# Patient Record
Sex: FEMALE | Race: BLACK OR AFRICAN AMERICAN | NOT HISPANIC OR LATINO
[De-identification: names, ages, dates, MRNs, and addresses within clinical notes are randomized per-mention and may not be internally consistent; named-entity substitution may affect disease eponyms.]

---

## 2017-07-27 ENCOUNTER — OTHER (OUTPATIENT)
Age: 32
End: 2017-07-27

## 2017-08-18 ENCOUNTER — APPOINTMENT (OUTPATIENT)
Dept: OBGYN | Facility: CLINIC | Age: 32
End: 2017-08-18

## 2017-09-14 ENCOUNTER — APPOINTMENT (OUTPATIENT)
Dept: OBGYN | Facility: CLINIC | Age: 32
End: 2017-09-14

## 2017-10-23 ENCOUNTER — APPOINTMENT (OUTPATIENT)
Dept: OBGYN | Facility: CLINIC | Age: 32
End: 2017-10-23

## 2018-01-08 ENCOUNTER — LABORATORY RESULT (OUTPATIENT)
Age: 33
End: 2018-01-08

## 2018-01-09 ENCOUNTER — LABORATORY RESULT (OUTPATIENT)
Age: 33
End: 2018-01-09

## 2018-01-09 ENCOUNTER — APPOINTMENT (OUTPATIENT)
Dept: OBGYN | Facility: CLINIC | Age: 33
End: 2018-01-09
Payer: MEDICAID

## 2018-01-09 ENCOUNTER — OUTPATIENT (OUTPATIENT)
Dept: OUTPATIENT SERVICES | Facility: HOSPITAL | Age: 33
LOS: 1 days | End: 2018-01-09
Payer: COMMERCIAL

## 2018-01-09 VITALS
WEIGHT: 144.38 LBS | HEIGHT: 65 IN | SYSTOLIC BLOOD PRESSURE: 100 MMHG | BODY MASS INDEX: 24.06 KG/M2 | DIASTOLIC BLOOD PRESSURE: 62 MMHG

## 2018-01-09 DIAGNOSIS — N76.0 ACUTE VAGINITIS: ICD-10-CM

## 2018-01-09 PROCEDURE — 87389 HIV-1 AG W/HIV-1&-2 AB AG IA: CPT

## 2018-01-09 PROCEDURE — 87624 HPV HI-RISK TYP POOLED RSLT: CPT

## 2018-01-09 PROCEDURE — 87625 HPV TYPES 16 & 18 ONLY: CPT

## 2018-01-09 PROCEDURE — 86780 TREPONEMA PALLIDUM: CPT

## 2018-01-09 PROCEDURE — 88175 CYTOPATH C/V AUTO FLUID REDO: CPT

## 2018-01-09 PROCEDURE — G0463: CPT

## 2018-01-09 PROCEDURE — 88141 CYTOPATH C/V INTERPRET: CPT

## 2018-01-09 PROCEDURE — 99395 PREV VISIT EST AGE 18-39: CPT | Mod: NC

## 2018-01-10 LAB
C TRACH RRNA SPEC QL NAA+PROBE: SIGNIFICANT CHANGE UP
HIV 1+2 AB+HIV1 P24 AG SERPL QL IA: SIGNIFICANT CHANGE UP
HPV HIGH+LOW RISK DNA PNL CVX: DETECTED
N GONORRHOEA RRNA SPEC QL NAA+PROBE: SIGNIFICANT CHANGE UP
SPECIMEN SOURCE: SIGNIFICANT CHANGE UP
T PALLIDUM AB TITR SER: NEGATIVE — SIGNIFICANT CHANGE UP

## 2018-01-11 LAB — CYTOLOGY SPEC DOC CYTO: SIGNIFICANT CHANGE UP

## 2018-01-12 DIAGNOSIS — Z01.419 ENCOUNTER FOR GYNECOLOGICAL EXAMINATION (GENERAL) (ROUTINE) WITHOUT ABNORMAL FINDINGS: ICD-10-CM

## 2018-02-07 ENCOUNTER — APPOINTMENT (OUTPATIENT)
Dept: OBGYN | Facility: CLINIC | Age: 33
End: 2018-02-07
Payer: MEDICAID

## 2018-02-07 ENCOUNTER — OUTPATIENT (OUTPATIENT)
Dept: OUTPATIENT SERVICES | Facility: HOSPITAL | Age: 33
LOS: 1 days | End: 2018-02-07
Payer: MEDICARE

## 2018-02-07 ENCOUNTER — LABORATORY RESULT (OUTPATIENT)
Age: 33
End: 2018-02-07

## 2018-02-07 DIAGNOSIS — R87.619 UNSPECIFIED ABNORMAL CYTOLOGICAL FINDINGS IN SPECIMENS FROM CERVIX UTERI: ICD-10-CM

## 2018-02-07 DIAGNOSIS — N76.0 ACUTE VAGINITIS: ICD-10-CM

## 2018-02-07 PROCEDURE — 57452 EXAM OF CERVIX W/SCOPE: CPT | Mod: GC

## 2018-02-07 PROCEDURE — 57452 EXAM OF CERVIX W/SCOPE: CPT

## 2018-03-26 ENCOUNTER — APPOINTMENT (OUTPATIENT)
Dept: OBGYN | Facility: CLINIC | Age: 33
End: 2018-03-26

## 2018-04-26 ENCOUNTER — APPOINTMENT (OUTPATIENT)
Dept: OBGYN | Facility: CLINIC | Age: 33
End: 2018-04-26
Payer: MEDICAID

## 2018-04-26 ENCOUNTER — LABORATORY RESULT (OUTPATIENT)
Age: 33
End: 2018-04-26

## 2018-04-26 ENCOUNTER — OUTPATIENT (OUTPATIENT)
Dept: OUTPATIENT SERVICES | Facility: HOSPITAL | Age: 33
LOS: 1 days | End: 2018-04-26
Payer: MEDICARE

## 2018-04-26 VITALS — BODY MASS INDEX: 23.8 KG/M2 | WEIGHT: 143 LBS | SYSTOLIC BLOOD PRESSURE: 108 MMHG | DIASTOLIC BLOOD PRESSURE: 70 MMHG

## 2018-04-26 DIAGNOSIS — N76.0 ACUTE VAGINITIS: ICD-10-CM

## 2018-04-26 DIAGNOSIS — Z87.42 PERSONAL HISTORY OF OTHER DISEASES OF THE FEMALE GENITAL TRACT: ICD-10-CM

## 2018-04-26 DIAGNOSIS — N72 INFLAMMATORY DISEASE OF CERVIX UTERI: ICD-10-CM

## 2018-04-26 PROCEDURE — 87800 DETECT AGNT MULT DNA DIREC: CPT

## 2018-04-26 PROCEDURE — G0463: CPT

## 2018-04-26 PROCEDURE — 99213 OFFICE O/P EST LOW 20 MIN: CPT | Mod: NC

## 2018-04-26 PROCEDURE — 87340 HEPATITIS B SURFACE AG IA: CPT

## 2018-04-26 PROCEDURE — 86803 HEPATITIS C AB TEST: CPT

## 2018-04-26 RX ORDER — METRONIDAZOLE 500 MG/1
500 TABLET ORAL
Qty: 4 | Refills: 0 | Status: ACTIVE | COMMUNITY
Start: 2018-04-26 | End: 1900-01-01

## 2018-04-27 LAB
CANDIDA AB TITR SER: SIGNIFICANT CHANGE UP
G VAGINALIS DNA SPEC QL NAA+PROBE: DETECTED
HBV SURFACE AG SER-ACNC: SIGNIFICANT CHANGE UP
HCV AB S/CO SERPL IA: 0.15 S/CO — SIGNIFICANT CHANGE UP
HCV AB SERPL-IMP: SIGNIFICANT CHANGE UP
T VAGINALIS SPEC QL WET PREP: DETECTED

## 2018-04-30 RX ORDER — METRONIDAZOLE 7.5 MG/G
0.75 GEL VAGINAL
Qty: 1 | Refills: 1 | Status: DISCONTINUED | COMMUNITY
Start: 2018-01-09 | End: 2018-04-30

## 2018-05-04 DIAGNOSIS — N72 INFLAMMATORY DISEASE OF CERVIX UTERI: ICD-10-CM

## 2018-05-04 DIAGNOSIS — Z11.3 ENCOUNTER FOR SCREENING FOR INFECTIONS WITH A PREDOMINANTLY SEXUAL MODE OF TRANSMISSION: ICD-10-CM

## 2019-05-23 ENCOUNTER — APPOINTMENT (OUTPATIENT)
Dept: OBGYN | Facility: CLINIC | Age: 34
End: 2019-05-23

## 2019-07-17 ENCOUNTER — APPOINTMENT (OUTPATIENT)
Dept: OBGYN | Facility: CLINIC | Age: 34
End: 2019-07-17

## 2019-07-22 ENCOUNTER — LABORATORY RESULT (OUTPATIENT)
Age: 34
End: 2019-07-22

## 2019-07-23 ENCOUNTER — APPOINTMENT (OUTPATIENT)
Dept: OBGYN | Facility: CLINIC | Age: 34
End: 2019-07-23
Payer: MEDICARE

## 2019-07-23 ENCOUNTER — LABORATORY RESULT (OUTPATIENT)
Age: 34
End: 2019-07-23

## 2019-07-23 ENCOUNTER — OUTPATIENT (OUTPATIENT)
Dept: OUTPATIENT SERVICES | Facility: HOSPITAL | Age: 34
LOS: 1 days | End: 2019-07-23
Payer: MEDICARE

## 2019-07-23 VITALS — DIASTOLIC BLOOD PRESSURE: 80 MMHG | SYSTOLIC BLOOD PRESSURE: 130 MMHG | WEIGHT: 139 LBS | BODY MASS INDEX: 23.13 KG/M2

## 2019-07-23 DIAGNOSIS — R61 GENERALIZED HYPERHIDROSIS: ICD-10-CM

## 2019-07-23 DIAGNOSIS — N76.0 ACUTE VAGINITIS: ICD-10-CM

## 2019-07-23 DIAGNOSIS — L02.91 CUTANEOUS ABSCESS, UNSPECIFIED: ICD-10-CM

## 2019-07-23 PROCEDURE — 36415 COLL VENOUS BLD VENIPUNCTURE: CPT

## 2019-07-23 PROCEDURE — G0463: CPT

## 2019-07-23 PROCEDURE — 36415 COLL VENOUS BLD VENIPUNCTURE: CPT | Mod: NC

## 2019-07-23 PROCEDURE — 87340 HEPATITIS B SURFACE AG IA: CPT

## 2019-07-23 PROCEDURE — 99214 OFFICE O/P EST MOD 30 MIN: CPT | Mod: NC

## 2019-07-23 PROCEDURE — 87624 HPV HI-RISK TYP POOLED RSLT: CPT

## 2019-07-23 PROCEDURE — 87389 HIV-1 AG W/HIV-1&-2 AB AG IA: CPT

## 2019-07-23 PROCEDURE — 87491 CHLMYD TRACH DNA AMP PROBE: CPT

## 2019-07-23 PROCEDURE — 86780 TREPONEMA PALLIDUM: CPT

## 2019-07-23 PROCEDURE — 87591 N.GONORRHOEAE DNA AMP PROB: CPT

## 2019-07-23 RX ORDER — METRONIDAZOLE 7.5 MG/G
0.75 GEL VAGINAL
Qty: 1 | Refills: 0 | Status: ACTIVE | COMMUNITY
Start: 2019-07-23 | End: 1900-01-01

## 2019-07-23 NOTE — PROCEDURE
[Liquid Base] : liquid base [Cervical Pap Smear] : cervical Pap smear [No Complications] : there were no complications [GC Chlamydia Culture] : GC Chlamydia Culture

## 2019-07-23 NOTE — PHYSICAL EXAM
[Acute Distress] : no acute distress [Alert] : alert [Awake] : awake [Nipple Discharge] : no nipple discharge [Mass] : no breast mass [Soft] : soft [Axillary LAD] : no axillary lymphadenopathy [Tender] : non tender [Oriented x3] : oriented to person, place, and time [No Lesions] : no genitalia lesions [Labia Majora] : labia major [Labia Minora] : labia minora [Normal] : clitoris [No Bleeding] : there was no active vaginal bleeding [Discharge] : had a ~M discharge [Motion Tenderness] : there was no cervical motion tenderness [Pap Obtained] : a Pap smear was performed [Tenderness] : nontender [Normal Position] : in a normal position [Uterine Adnexae] : were not tender and not enlarged [Enlarged ___ wks] : not enlarged [Mass ___ cm] : no uterine mass was palpated [FreeTextEntry5] : wet prpep POS clue cells neg  trich or hyphae PH wnl   [CTAB] : CTAB [RRR, No Murmurs] : RRR, no murmurs [FreeTextEntry9] : Boil  L lateral to Gluteal cleft superior portion    resolving L buttock   mobile minimal tenderness

## 2019-07-23 NOTE — HISTORY OF PRESENT ILLNESS
[Definite:  ___ (Date)] : the last menstrual period was [unfilled] [Normal Amount/Duration] : was of a normal amount and duration [Spotting Between  Menses] : no spotting between menses [Sexually Active] : is not sexually active [Condoms] : uses condoms [Contraception] : uses contraception [No] : no

## 2019-07-24 LAB
C TRACH RRNA SPEC QL NAA+PROBE: SIGNIFICANT CHANGE UP
HBV SURFACE AG SER-ACNC: SIGNIFICANT CHANGE UP
HIV 1+2 AB+HIV1 P24 AG SERPL QL IA: SIGNIFICANT CHANGE UP
HPV HIGH+LOW RISK DNA PNL CVX: SIGNIFICANT CHANGE UP
N GONORRHOEA RRNA SPEC QL NAA+PROBE: SIGNIFICANT CHANGE UP
SPECIMEN SOURCE: SIGNIFICANT CHANGE UP
T PALLIDUM AB TITR SER: NEGATIVE — SIGNIFICANT CHANGE UP

## 2019-07-27 LAB — CYTOLOGY SPEC DOC CYTO: SIGNIFICANT CHANGE UP

## 2019-08-08 DIAGNOSIS — R61 GENERALIZED HYPERHIDROSIS: ICD-10-CM

## 2019-08-08 DIAGNOSIS — L02.91 CUTANEOUS ABSCESS, UNSPECIFIED: ICD-10-CM

## 2020-06-15 ENCOUNTER — APPOINTMENT (OUTPATIENT)
Dept: OBGYN | Facility: CLINIC | Age: 35
End: 2020-06-15

## 2020-06-30 ENCOUNTER — APPOINTMENT (OUTPATIENT)
Dept: OBGYN | Facility: CLINIC | Age: 35
End: 2020-06-30
Payer: MEDICARE

## 2020-06-30 VITALS
WEIGHT: 162 LBS | BODY MASS INDEX: 26.99 KG/M2 | HEIGHT: 65 IN | SYSTOLIC BLOOD PRESSURE: 136 MMHG | DIASTOLIC BLOOD PRESSURE: 86 MMHG

## 2020-06-30 DIAGNOSIS — Z01.419 ENCOUNTER FOR GYNECOLOGICAL EXAMINATION (GENERAL) (ROUTINE) W/OUT ABNORMAL FINDINGS: ICD-10-CM

## 2020-06-30 LAB
HBV SURFACE AG SER QL: NONREACTIVE
HCG UR QL: NEGATIVE
HCV AB SER QL: NONREACTIVE
HCV S/CO RATIO: 0.13 S/CO
HIV1+2 AB SPEC QL IA.RAPID: NONREACTIVE
QUALITY CONTROL: YES

## 2020-06-30 PROCEDURE — G0101: CPT

## 2020-06-30 PROCEDURE — 81025 URINE PREGNANCY TEST: CPT

## 2020-06-30 NOTE — PHYSICAL EXAM
[Normal] : vagina [Pap Obtained] : a Pap smear was performed [Discharge] : had no discharge [No Bleeding] : there was no active vaginal bleeding [Uterine Adnexae] : were not tender and not enlarged [IUD String] : did not have an IUD string protruding out [Motion Tenderness] : there was no cervical motion tenderness [de-identified] : as above [de-identified] : no abnormalities [FreeTextEntry5] : multiparous fish mouthed cervix

## 2020-07-01 LAB — T PALLIDUM AB SER QL IA: NEGATIVE

## 2020-07-02 LAB
C TRACH RRNA SPEC QL NAA+PROBE: NOT DETECTED
CANDIDA VAG CYTO: NOT DETECTED
G VAGINALIS+PREV SP MTYP VAG QL MICRO: DETECTED
HPV HIGH+LOW RISK DNA PNL CVX: NOT DETECTED
N GONORRHOEA RRNA SPEC QL NAA+PROBE: NOT DETECTED
SOURCE AMPLIFICATION: NORMAL
T VAGINALIS VAG QL WET PREP: NOT DETECTED

## 2020-07-07 LAB — CYTOLOGY CVX/VAG DOC THIN PREP: ABNORMAL

## 2020-12-17 ENCOUNTER — APPOINTMENT (OUTPATIENT)
Dept: OBGYN | Facility: CLINIC | Age: 35
End: 2020-12-17
Payer: MEDICARE

## 2020-12-17 VITALS
SYSTOLIC BLOOD PRESSURE: 134 MMHG | WEIGHT: 162 LBS | DIASTOLIC BLOOD PRESSURE: 82 MMHG | HEIGHT: 65 IN | TEMPERATURE: 98 F | BODY MASS INDEX: 26.99 KG/M2

## 2020-12-17 DIAGNOSIS — N94.9 UNSPECIFIED CONDITION ASSOCIATED WITH FEMALE GENITAL ORGANS AND MENSTRUAL CYCLE: ICD-10-CM

## 2020-12-17 DIAGNOSIS — Z11.3 ENCOUNTER FOR SCREENING FOR INFECTIONS WITH A PREDOMINANTLY SEXUAL MODE OF TRANSMISSION: ICD-10-CM

## 2020-12-17 LAB
BILIRUB UR QL STRIP: NORMAL
CLARITY UR: CLEAR
COLLECTION METHOD: NORMAL
GLUCOSE UR-MCNC: NORMAL
HBV SURFACE AG SER QL: NONREACTIVE
HCG UR QL: 1 EU/DL
HCG UR QL: NEGATIVE
HCV AB SER QL: NONREACTIVE
HCV S/CO RATIO: 0.09 S/CO
HGB UR QL STRIP.AUTO: NORMAL
KETONES UR-MCNC: NORMAL
LEUKOCYTE ESTERASE UR QL STRIP: NORMAL
NITRITE UR QL STRIP: NORMAL
PH UR STRIP: 7
PROT UR STRIP-MCNC: NORMAL
QUALITY CONTROL: YES
SP GR UR STRIP: 1.02

## 2020-12-17 PROCEDURE — 81025 URINE PREGNANCY TEST: CPT

## 2020-12-17 PROCEDURE — 81002 URINALYSIS NONAUTO W/O SCOPE: CPT

## 2020-12-17 PROCEDURE — 99072 ADDL SUPL MATRL&STAF TM PHE: CPT

## 2020-12-17 PROCEDURE — 99213 OFFICE O/P EST LOW 20 MIN: CPT

## 2020-12-17 NOTE — PHYSICAL EXAM
[Labia Majora] : normal [Labia Minora] : normal [Normal] : normal [Uterine Adnexae] : normal [FreeTextEntry4] : thin white discharge

## 2020-12-18 LAB
C TRACH RRNA SPEC QL NAA+PROBE: NOT DETECTED
N GONORRHOEA RRNA SPEC QL NAA+PROBE: NOT DETECTED
SOURCE AMPLIFICATION: NORMAL
T PALLIDUM AB SER QL IA: NEGATIVE

## 2020-12-20 LAB
BACTERIA UR CULT: NORMAL
CANDIDA VAG CYTO: NOT DETECTED
G VAGINALIS+PREV SP MTYP VAG QL MICRO: DETECTED
HIV1+2 AB SPEC QL IA.RAPID: NONREACTIVE
T VAGINALIS VAG QL WET PREP: NOT DETECTED

## 2020-12-20 RX ORDER — METRONIDAZOLE 500 MG/1
500 TABLET ORAL TWICE DAILY
Qty: 14 | Refills: 0 | Status: ACTIVE | COMMUNITY
Start: 2020-12-20 | End: 1900-01-01

## 2020-12-23 PROBLEM — Z01.419 ENCOUNTER FOR CERVICAL PAP SMEAR WITH PELVIC EXAM: Status: RESOLVED | Noted: 2018-01-09 | Resolved: 2020-12-23

## 2021-04-12 ENCOUNTER — APPOINTMENT (OUTPATIENT)
Dept: OBGYN | Facility: CLINIC | Age: 36
End: 2021-04-12

## 2021-04-28 ENCOUNTER — APPOINTMENT (OUTPATIENT)
Dept: OBGYN | Facility: CLINIC | Age: 36
End: 2021-04-28
Payer: MEDICARE

## 2021-04-28 VITALS
HEIGHT: 65 IN | WEIGHT: 162 LBS | BODY MASS INDEX: 26.99 KG/M2 | DIASTOLIC BLOOD PRESSURE: 60 MMHG | SYSTOLIC BLOOD PRESSURE: 112 MMHG

## 2021-04-28 DIAGNOSIS — Z30.09 ENCOUNTER FOR OTHER GENERAL COUNSELING AND ADVICE ON CONTRACEPTION: ICD-10-CM

## 2021-04-28 DIAGNOSIS — Z11.3 ENCOUNTER FOR SCREENING FOR INFECTIONS WITH A PREDOMINANTLY SEXUAL MODE OF TRANSMISSION: ICD-10-CM

## 2021-04-28 LAB
HCG UR QL: NEGATIVE
QUALITY CONTROL: YES

## 2021-04-28 PROCEDURE — 99072 ADDL SUPL MATRL&STAF TM PHE: CPT

## 2021-04-28 PROCEDURE — 81025 URINE PREGNANCY TEST: CPT

## 2021-04-28 PROCEDURE — 99213 OFFICE O/P EST LOW 20 MIN: CPT

## 2021-04-28 NOTE — PHYSICAL EXAM
[Labia Majora] : normal [Labia Minora] : normal [Normal] : normal [Uterine Adnexae] : normal [FreeTextEntry4] : yellow white thin discharge

## 2021-04-29 LAB
C TRACH RRNA SPEC QL NAA+PROBE: NOT DETECTED
CANDIDA VAG CYTO: NOT DETECTED
G VAGINALIS+PREV SP MTYP VAG QL MICRO: DETECTED
HBV SURFACE AG SER QL: NONREACTIVE
HCV AB SER QL: NONREACTIVE
HCV S/CO RATIO: 0.16 S/CO
HIV1+2 AB SPEC QL IA.RAPID: NONREACTIVE
N GONORRHOEA RRNA SPEC QL NAA+PROBE: NOT DETECTED
SOURCE AMPLIFICATION: NORMAL
T PALLIDUM AB SER QL IA: NEGATIVE
T VAGINALIS VAG QL WET PREP: NOT DETECTED

## 2021-07-22 ENCOUNTER — NON-APPOINTMENT (OUTPATIENT)
Age: 36
End: 2021-07-22

## 2021-09-02 RX ORDER — METRONIDAZOLE 7.5 MG/G
0.75 GEL VAGINAL
Qty: 1 | Refills: 5 | Status: ACTIVE | COMMUNITY
Start: 2020-12-20 | End: 1900-01-01

## 2021-09-02 RX ORDER — METRONIDAZOLE 500 MG/1
500 TABLET ORAL TWICE DAILY
Qty: 14 | Refills: 0 | Status: ACTIVE | COMMUNITY
Start: 2020-07-02 | End: 1900-01-01

## 2021-09-14 ENCOUNTER — APPOINTMENT (OUTPATIENT)
Dept: OBGYN | Facility: CLINIC | Age: 36
End: 2021-09-14
Payer: MEDICARE

## 2021-09-14 VITALS — SYSTOLIC BLOOD PRESSURE: 114 MMHG | DIASTOLIC BLOOD PRESSURE: 76 MMHG

## 2021-09-14 DIAGNOSIS — N89.8 OTHER SPECIFIED NONINFLAMMATORY DISORDERS OF VAGINA: ICD-10-CM

## 2021-09-14 PROCEDURE — 99214 OFFICE O/P EST MOD 30 MIN: CPT | Mod: 25

## 2021-09-14 PROCEDURE — 76830 TRANSVAGINAL US NON-OB: CPT | Mod: 26

## 2021-09-14 NOTE — COUNSELING
[Contraception/ Emergency Contraception/ Safe Sexual Practices] : contraception, emergency contraception, safe sexual practices [Preconception Care/ Fertility options] : preconception care, fertility options [Pregnancy Options] : pregnancy options

## 2021-09-14 NOTE — PROCEDURE
[Transvaginal OB Sonogram] : Transvaginal OB Sonogram [FreeTextEntry1] : Tiny irreg shaped GS, no YS/FP

## 2021-09-15 LAB
ABO + RH PNL BLD: NORMAL
BASOPHILS # BLD AUTO: 0.05 K/UL
BASOPHILS NFR BLD AUTO: 0.9 %
BLD GP AB SCN SERPL QL: NORMAL
C TRACH RRNA SPEC QL NAA+PROBE: NOT DETECTED
EOSINOPHIL # BLD AUTO: 0.07 K/UL
EOSINOPHIL NFR BLD AUTO: 1.2 %
HBV SURFACE AG SER QL: NONREACTIVE
HCG SERPL-MCNC: 413 MIU/ML
HCT VFR BLD CALC: 40.3 %
HCV AB SER QL: NONREACTIVE
HCV S/CO RATIO: 0.16 S/CO
HGB BLD-MCNC: 13.6 G/DL
HIV1+2 AB SPEC QL IA.RAPID: NONREACTIVE
IMM GRANULOCYTES NFR BLD AUTO: 0.2 %
LYMPHOCYTES # BLD AUTO: 2.4 K/UL
LYMPHOCYTES NFR BLD AUTO: 42.3 %
MAN DIFF?: NORMAL
MCHC RBC-ENTMCNC: 32.4 PG
MCHC RBC-ENTMCNC: 33.7 GM/DL
MCV RBC AUTO: 96 FL
MONOCYTES # BLD AUTO: 0.6 K/UL
MONOCYTES NFR BLD AUTO: 10.6 %
N GONORRHOEA RRNA SPEC QL NAA+PROBE: NOT DETECTED
NEUTROPHILS # BLD AUTO: 2.54 K/UL
NEUTROPHILS NFR BLD AUTO: 44.8 %
PLATELET # BLD AUTO: 342 K/UL
RBC # BLD: 4.2 M/UL
RBC # FLD: 13.4 %
SOURCE AMPLIFICATION: NORMAL
T PALLIDUM AB SER QL IA: NEGATIVE
WBC # FLD AUTO: 5.67 K/UL

## 2021-09-16 DIAGNOSIS — Z00.00 ENCOUNTER FOR GENERAL ADULT MEDICAL EXAMINATION W/OUT ABNORMAL FINDINGS: ICD-10-CM

## 2021-09-16 LAB
CANDIDA VAG CYTO: NOT DETECTED
G VAGINALIS+PREV SP MTYP VAG QL MICRO: DETECTED
T VAGINALIS VAG QL WET PREP: NOT DETECTED

## 2021-09-17 LAB — HCG SERPL-MCNC: 782 MIU/ML

## 2021-09-21 ENCOUNTER — NON-APPOINTMENT (OUTPATIENT)
Age: 36
End: 2021-09-21

## 2021-09-21 DIAGNOSIS — N92.6 IRREGULAR MENSTRUATION, UNSPECIFIED: ICD-10-CM

## 2021-09-27 ENCOUNTER — APPOINTMENT (OUTPATIENT)
Dept: OBGYN | Facility: CLINIC | Age: 36
End: 2021-09-27
Payer: MEDICARE

## 2021-09-27 ENCOUNTER — EMERGENCY (EMERGENCY)
Facility: HOSPITAL | Age: 36
LOS: 1 days | Discharge: HOME | End: 2021-09-27
Payer: MEDICARE

## 2021-09-27 VITALS
HEIGHT: 65 IN | BODY MASS INDEX: 26.99 KG/M2 | WEIGHT: 162 LBS | SYSTOLIC BLOOD PRESSURE: 127 MMHG | DIASTOLIC BLOOD PRESSURE: 82 MMHG

## 2021-09-27 VITALS
TEMPERATURE: 98 F | RESPIRATION RATE: 18 BRPM | HEIGHT: 65 IN | HEART RATE: 99 BPM | DIASTOLIC BLOOD PRESSURE: 75 MMHG | OXYGEN SATURATION: 98 % | SYSTOLIC BLOOD PRESSURE: 119 MMHG | WEIGHT: 173.06 LBS

## 2021-09-27 DIAGNOSIS — O36.80X0 PREGNANCY WITH INCONCLUSIVE FETAL VIABILITY, NOT APPLICABLE OR UNSPECIFIED: ICD-10-CM

## 2021-09-27 LAB — HCG SERPL-MCNC: 2228 MIU/ML

## 2021-09-27 PROCEDURE — 99213 OFFICE O/P EST LOW 20 MIN: CPT | Mod: NC

## 2021-09-27 PROCEDURE — 99285 EMERGENCY DEPT VISIT HI MDM: CPT

## 2021-09-27 NOTE — ED PROVIDER NOTE - CARE PROVIDER_API CALL
Anisa Silva)  Obstetrics and Gynecology  [address] [place] [address]  Phone: [phone]  Fax: [phone]  Follow Up Time: 4-6 Days

## 2021-09-27 NOTE — ED PROVIDER NOTE - NSFOLLOWUPINSTRUCTIONS_ED_ALL_ED_FT
Ectopic Pregnancy    An ectopic pregnancy is the term for a pregnancy that is not in the correct location. This can become a life-threatening emergency if the ectopic pregnancy causes heavy internal or vaginal bleeding. An ectopic pregnancy cannot continue to term and must be managed by your obstetrician. This can cause issues with fertility and future pregnancies.    SEEK IMMEDIATE MEDICAL CARE IF YOU HAVE ANY OF THE FOLLOWING SYMPTOMS: heavy vaginal bleeding, severe low back or abdominal cramps, fever/chills, lightheadedness/dizziness, or fainting.    YOU HAVE OPTED FOR TREATMENT OF THIS CONDITION WITH METHOTREXATE, WHICH WILL BE ADMINISTERED IN THE EMERGENCY DEPARTMENT.    FOLLOW UP WITH DR. SAMARA MOYA, IN 4 DAYS FOR YOUR 2ND DOSE OF METHOTREXATE.    RETURN TO THE ED IMMEDIATELY FOR HEAVY BLEEDING, CRAMPING, LIGHT-HEADEDNESS, ABDOMINAL PAIN, OR ANY OTHER CONCERNS.     CALL DR. MOYA'S OFFICE TOMORROW MORNING TO CONFIRM YOUR FOLLOW-UP APPOINTMENT.    Anisa Moya)  Obstetrics and Gynecology  [address] [place] [address]  Phone: [phone]  Fax: [phone]  Follow Up Time: 4-6 Days

## 2021-09-27 NOTE — ED PROVIDER NOTE - PATIENT PORTAL LINK FT
You can access the FollowMyHealth Patient Portal offered by [facility] by registering at the following website: [email or web address]. By joining [employer]’s FollowMyHealth portal, you will also be able to view your health information using other applications (apps) compatible with our system.

## 2021-09-27 NOTE — ED PROVIDER NOTE - PHYSICAL EXAMINATION
GENERAL: well appearing in no acute distress, non-toxic appearing  HEAD: normocephalic, atraumatic  HENT: airway intact, neck supple  EYES: normal conjunctiva, EOMI, PERRL  CARDIAC: regular rate and rhythm, normal S1S2, no appreciable murmurs, 2+ pulses in UE/LE b/l  PULM: normal breath sounds, clear to ascultation bilaterally, no rales, rhonchi, wheezing  GI: abdomen nondistended, soft, LLQ TTP; no guarding, rebound tenderness  : no CVA tenderness b/l, no suprapubic tenderness  NEURO: no focal motor or sensory deficits, CN2-12 intact, normal speech, PERRLA, EOMI, normal gait, AAOx3  MSK: no peripheral edema, no calf tenderness b/l  SKIN: well-perfused, extremities warm, no visible rashes  PSYCH: appropriate mood and affect GENERAL: well appearing in no acute distress, non-toxic appearing  HEAD: normocephalic, atraumatic  HENT: airway intact, neck supple  EYES: normal conjunctiva, EOMI, PERRL  CARDIAC: regular rate and rhythm, normal S1S2, no appreciable murmurs, 2+ pulses in UE/LE b/l  PULM: normal breath sounds, clear to ascultation bilaterally, no rales, rhonchi, wheezing  GI: abdomen nondistended, soft, LLQ TTP; no guarding, rebound tenderness  : no CVA tenderness b/l, no suprapubic tenderness; patient refusing pelvic exam from male physician  NEURO: no focal motor or sensory deficits  MSK: no peripheral edema, no calf tenderness b/l  SKIN: well-perfused, extremities warm, no visible rashes  PSYCH: appropriate mood and affect

## 2021-09-27 NOTE — ED PROVIDER NOTE - NS ED ROS FT
General: denies fever, chills  HENT: denies nasal congestion, rhinorrhea  Eyes: denies visual changes, blurred vision  CV: denies chest pain, palpitations  Resp: denies difficulty breathing, cough  Abdominal: abdominal pain  : vaginal bleeding  MSK: denies muscle aches, leg swelling  Neuro: denies headaches, numbness, tingling  Skin: denies rashes, bruises

## 2021-09-27 NOTE — ED PROVIDER NOTE - PROGRESS NOTE ADDITIONAL1
Notes recorded by Archie Milan MD on 8/22/2018 at 12:51 PM CDT  Her EKG and lab is ok and she is cleared for surgery     Faxed clearance (last note,labs,ekg) to dr willson at [phone].    We did receive previous xrays and 2 office notes from dr willson's office and dr milan reviewed and sending to scan on chart.   Additional Progress Note...

## 2021-09-27 NOTE — ED ADULT TRIAGE NOTE - CHIEF COMPLAINT QUOTE
sent in by OB/GYN (Dr Edwards) for rule out ectopic pregnancy today, lower abdominal pain, vaginal bleeding  LMP 8/10; about 6-8 weeks pregnant,

## 2021-09-27 NOTE — ED PROVIDER NOTE - CLINICAL SUMMARY MEDICAL DECISION MAKING FREE TEXT BOX
Impression:  ectopic until proven otherwise.   Plan:  cbc, cmp, type, UA/UCx, HCG, TVUS, d/w OB/GYN (already aware, this is one of their clinic patients).

## 2021-09-27 NOTE — ED PROVIDER NOTE - OBJECTIVE STATEMENT
35yo A1 37yo  approximately 7 weeks pregnant (LMP 8/10/21) coming in for vaginal spotting and lower abdominal cramping. Symptoms started 2 days ago. Went to see OBGYN Dr. Hayden and had a concerned TVUS for possible ectopic pregnancy. Has had one ectopic pregnancy in the past. Denies fevers/chills ,chest pain, or SOB.

## 2021-09-27 NOTE — ED ADULT NURSE NOTE - OBJECTIVE STATEMENT
Pt is a 36y Female c/o vaginal bleeding x 2 days referred here by her OBGYN office for rule out ectopic pregnancy . Pt endorses lower abdominal pain and vaginal bleeding about 1 pad every 2-3 hours. Pt states her LMP was on 8/10. Pt resting comfortably in bed not agreeable to vaginal exam by ED MD as "he is male". Pt states pain is cramping in nature. Pt denies any pertinent PMHx. Pt prefers to go by Milena or Sheron and uses She/Her/Hers pronouns. Pt resting comfortably in bed with MD at bedside. Pt educated on call bell use and call bell placed at bedside. Pt safety maintained.

## 2021-09-27 NOTE — PHYSICAL EXAM
[Labia Majora] : normal [Labia Minora] : normal [Scant] : There was scant vaginal bleeding [Normal] : normal [Adnexa Tenderness On The Left] : tender

## 2021-09-27 NOTE — ED PROVIDER NOTE - PROGRESS NOTE DETAILS
El, PGY2: OBGYN aware and will evaluate El, PGY2: OBGYN consulted and informed of patients refusal of pelvic exam; will evaluate an Patient seen at bedside by OB/GYN Resident/Attg (Michelle Hernandez & Ricardo)  Patient was offered surgery, but patient requesting Methotrexate.  OB/GYN attg and resident had a long conversation with the patient re: risks/benefits and she still wants MTX.  Patient consented by OB/GYN team, and will be given MTX and strict return precautions.    Patient to f/u with [facility] [address], [place]. El, PGY2: Patient received MTX; informed patient we recommend monitoring for 30 minutes but patient is refusing to wait. States she's had this in the past with no reaction and wants to leave. Informed patient of possible side effects, including anaphylaxis and if severe enough, death. patient understands and is adamant about leaving

## 2021-09-27 NOTE — ED PROVIDER NOTE - ATTENDING CONTRIBUTION TO CARE
MD David:  patient seen and evaluated with the resident.  I was present for key portions of the History and Physical, and I agree with the Impression and Plan.    35 yo F, c/o Preg VB.  Duration:  24 hrs  Associated Sx:  vaginal spotting and L-sided abd pain  VS: wnl.  Physical Exam: adult F, anxious-appearing, NCAT, PERRL, EOMI, neck supple, RRR, CTA B, Abd: s/nd/mild L-sided abd pain. Ext: no edema, Neuro: AAOx3, ambulates w/o diff, strength 5/5 & symmetric throughout.  Pelvic:  patient refusing  Impression:  ectopic until proven otherwise.   Plan:  cbc, cmp, type, UA/UCx, HCG, TVUS, d/w OB/GYN (already aware, this is one of their clinic patients).

## 2021-09-27 NOTE — PROCEDURE
[Transvaginal OB Sonogram] : Transvaginal OB Sonogram [Intrauterine Pregnancy] : no intrauterine pregnancy [FreeTextEntry1] : thin EMS, R ov wnl 1.9x1cm, L ov with complex structure measuring 2.1x1.7cm with increased vascularity and ?GS within suspicious for L ectopic

## 2021-09-28 VITALS
DIASTOLIC BLOOD PRESSURE: 78 MMHG | OXYGEN SATURATION: 100 % | SYSTOLIC BLOOD PRESSURE: 126 MMHG | RESPIRATION RATE: 20 BRPM | HEART RATE: 89 BPM | TEMPERATURE: 98 F

## 2021-09-28 LAB
ALBUMIN SERPL ELPH-MCNC: 4 G/DL — SIGNIFICANT CHANGE UP (ref 3.3–5)
ALP SERPL-CCNC: 94 U/L — SIGNIFICANT CHANGE UP (ref 40–120)
ALT FLD-CCNC: 12 U/L — SIGNIFICANT CHANGE UP (ref 10–45)
ANION GAP SERPL CALC-SCNC: 12 MMOL/L — SIGNIFICANT CHANGE UP (ref 5–17)
APTT BLD: 27.6 SEC — SIGNIFICANT CHANGE UP (ref 27.5–35.5)
AST SERPL-CCNC: 8 U/L — LOW (ref 10–40)
BASOPHILS # BLD AUTO: 0.04 K/UL — SIGNIFICANT CHANGE UP (ref 0–0.2)
BASOPHILS NFR BLD AUTO: 0.5 % — SIGNIFICANT CHANGE UP (ref 0–2)
BILIRUB SERPL-MCNC: 0.2 MG/DL — SIGNIFICANT CHANGE UP (ref 0.2–1.2)
BLD GP AB SCN SERPL QL: NEGATIVE — SIGNIFICANT CHANGE UP
BLD GP AB SCN SERPL QL: NEGATIVE — SIGNIFICANT CHANGE UP
BUN SERPL-MCNC: 18 MG/DL — SIGNIFICANT CHANGE UP (ref 7–23)
CALCIUM SERPL-MCNC: 9.4 MG/DL — SIGNIFICANT CHANGE UP (ref 8.4–10.5)
CHLORIDE SERPL-SCNC: 105 MMOL/L — SIGNIFICANT CHANGE UP (ref 96–108)
CO2 SERPL-SCNC: 22 MMOL/L — SIGNIFICANT CHANGE UP (ref 22–31)
CREAT SERPL-MCNC: 0.94 MG/DL — SIGNIFICANT CHANGE UP (ref 0.5–1.3)
EOSINOPHIL # BLD AUTO: 0.12 K/UL — SIGNIFICANT CHANGE UP (ref 0–0.5)
EOSINOPHIL NFR BLD AUTO: 1.5 % — SIGNIFICANT CHANGE UP (ref 0–6)
GLUCOSE SERPL-MCNC: 103 MG/DL — HIGH (ref 70–99)
HCG SERPL-ACNC: 4779 MIU/ML — HIGH
HCT VFR BLD CALC: 36.2 % — SIGNIFICANT CHANGE UP (ref 34.5–45)
HGB BLD-MCNC: 12.2 G/DL — SIGNIFICANT CHANGE UP (ref 11.5–15.5)
IMM GRANULOCYTES NFR BLD AUTO: 0.4 % — SIGNIFICANT CHANGE UP (ref 0–1.5)
INR BLD: 1.13 RATIO — SIGNIFICANT CHANGE UP (ref 0.88–1.16)
LYMPHOCYTES # BLD AUTO: 2.61 K/UL — SIGNIFICANT CHANGE UP (ref 1–3.3)
LYMPHOCYTES # BLD AUTO: 32.8 % — SIGNIFICANT CHANGE UP (ref 13–44)
MCHC RBC-ENTMCNC: 31.1 PG — SIGNIFICANT CHANGE UP (ref 27–34)
MCHC RBC-ENTMCNC: 33.7 GM/DL — SIGNIFICANT CHANGE UP (ref 32–36)
MCV RBC AUTO: 92.3 FL — SIGNIFICANT CHANGE UP (ref 80–100)
MONOCYTES # BLD AUTO: 0.77 K/UL — SIGNIFICANT CHANGE UP (ref 0–0.9)
MONOCYTES NFR BLD AUTO: 9.7 % — SIGNIFICANT CHANGE UP (ref 2–14)
NEUTROPHILS # BLD AUTO: 4.39 K/UL — SIGNIFICANT CHANGE UP (ref 1.8–7.4)
NEUTROPHILS NFR BLD AUTO: 55.1 % — SIGNIFICANT CHANGE UP (ref 43–77)
NRBC # BLD: 0 /100 WBCS — SIGNIFICANT CHANGE UP (ref 0–0)
PLATELET # BLD AUTO: 261 K/UL — SIGNIFICANT CHANGE UP (ref 150–400)
POTASSIUM SERPL-MCNC: 3.3 MMOL/L — LOW (ref 3.5–5.3)
POTASSIUM SERPL-SCNC: 3.3 MMOL/L — LOW (ref 3.5–5.3)
PROT SERPL-MCNC: 6.4 G/DL — SIGNIFICANT CHANGE UP (ref 6–8.3)
PROTHROM AB SERPL-ACNC: 13.5 SEC — SIGNIFICANT CHANGE UP (ref 10.6–13.6)
RBC # BLD: 3.92 M/UL — SIGNIFICANT CHANGE UP (ref 3.8–5.2)
RBC # FLD: 12.8 % — SIGNIFICANT CHANGE UP (ref 10.3–14.5)
RH IG SCN BLD-IMP: POSITIVE — SIGNIFICANT CHANGE UP
RH IG SCN BLD-IMP: POSITIVE — SIGNIFICANT CHANGE UP
SARS-COV-2 RNA SPEC QL NAA+PROBE: SIGNIFICANT CHANGE UP
SODIUM SERPL-SCNC: 139 MMOL/L — SIGNIFICANT CHANGE UP (ref 135–145)
WBC # BLD: 7.96 K/UL — SIGNIFICANT CHANGE UP (ref 3.8–10.5)
WBC # FLD AUTO: 7.96 K/UL — SIGNIFICANT CHANGE UP (ref 3.8–10.5)

## 2021-09-28 PROCEDURE — 85014 HEMATOCRIT: CPT

## 2021-09-28 PROCEDURE — 82435 ASSAY OF BLOOD CHLORIDE: CPT

## 2021-09-28 PROCEDURE — U0005: CPT

## 2021-09-28 PROCEDURE — 84295 ASSAY OF SERUM SODIUM: CPT

## 2021-09-28 PROCEDURE — 36415 COLL VENOUS BLD VENIPUNCTURE: CPT

## 2021-09-28 PROCEDURE — 86901 BLOOD TYPING SEROLOGIC RH(D): CPT

## 2021-09-28 PROCEDURE — U0003: CPT

## 2021-09-28 PROCEDURE — 84702 CHORIONIC GONADOTROPIN TEST: CPT

## 2021-09-28 PROCEDURE — 76817 TRANSVAGINAL US OBSTETRIC: CPT

## 2021-09-28 PROCEDURE — 82330 ASSAY OF CALCIUM: CPT

## 2021-09-28 PROCEDURE — 85730 THROMBOPLASTIN TIME PARTIAL: CPT

## 2021-09-28 PROCEDURE — 82803 BLOOD GASES ANY COMBINATION: CPT

## 2021-09-28 PROCEDURE — 99284 EMERGENCY DEPT VISIT MOD MDM: CPT | Mod: 25

## 2021-09-28 PROCEDURE — 96372 THER/PROPH/DIAG INJ SC/IM: CPT

## 2021-09-28 PROCEDURE — 93975 VASCULAR STUDY: CPT

## 2021-09-28 PROCEDURE — 76817 TRANSVAGINAL US OBSTETRIC: CPT | Mod: 26

## 2021-09-28 PROCEDURE — 86850 RBC ANTIBODY SCREEN: CPT

## 2021-09-28 PROCEDURE — 85025 COMPLETE CBC W/AUTO DIFF WBC: CPT

## 2021-09-28 PROCEDURE — 83605 ASSAY OF LACTIC ACID: CPT

## 2021-09-28 PROCEDURE — 84132 ASSAY OF SERUM POTASSIUM: CPT

## 2021-09-28 PROCEDURE — 85018 HEMOGLOBIN: CPT

## 2021-09-28 PROCEDURE — 80053 COMPREHEN METABOLIC PANEL: CPT

## 2021-09-28 PROCEDURE — 85610 PROTHROMBIN TIME: CPT

## 2021-09-28 PROCEDURE — 86900 BLOOD TYPING SEROLOGIC ABO: CPT

## 2021-09-28 PROCEDURE — 93975 VASCULAR STUDY: CPT | Mod: 26

## 2021-09-28 PROCEDURE — 82947 ASSAY GLUCOSE BLOOD QUANT: CPT

## 2021-09-28 RX ORDER — METHOTREXATE 2.5 MG/1
100 TABLET ORAL ONCE
Refills: 0 | Status: COMPLETED | OUTPATIENT
Start: 2021-09-28 | End: 2021-09-28

## 2021-09-28 RX ORDER — ACETAMINOPHEN 500 MG
975 TABLET ORAL ONCE
Refills: 0 | Status: COMPLETED | OUTPATIENT
Start: 2021-09-28 | End: 2021-09-28

## 2021-09-28 RX ADMIN — METHOTREXATE 100 MILLIGRAM(S): 2.5 TABLET ORAL at 03:26

## 2021-09-28 RX ADMIN — Medication 975 MILLIGRAM(S): at 03:20

## 2021-09-28 NOTE — ED ADULT NURSE REASSESSMENT NOTE - NS ED NURSE REASSESS COMMENT FT1
Pt stating she would like to leave, pt upset that methotrexate is taking long time to arrive and be given. Educated patient that medication order was pending and needed to be picked up from pharmacy. Pt states she would like to leave immediately after methotraxt Pt stating she would like to leave, pt upset that methotrexate is taking long time to arrive and be given. Educated patient that medication order was pending and needed to be picked up from pharmacy. Pt states she would like to leave immediately after methotrexate administration, states she is tired and needs to drive home. Pt advised that 30 minutes is the recommended amount of time to be observed after administration, states she has received it in the past and did not have adverse reaction. VSS/NAD.

## 2021-09-28 NOTE — CONSULT NOTE ADULT - SUBJECTIVE AND OBJECTIVE BOX
Gyn Consult Note  HAN WHEAT  36y  Female [id number]    HPI: 35y/o  @7w0d (by LMP 8/10/2021) presenting to the ED from the office for rule out ectopic pregnancy.    Patient was being followed in office for serial HCG after a missed period. Today in office, no IUP seen on TVUS, with questionable L adnexal structure seen. Patient also had left adnexal tenderness on exam. She reports LLQ pain for the past 3 days as well as vaginal spotting. She denies N/V, dizziness, SOB, CP, urinary symptoms, changes in bowel habits.    Name of GYN Physician: Dr. Hayden    OBHx:    -  x3 (, , )  - L ectopic pregnancy s/p MTX (~)  GYNHx: Denies fibroids, cysts, endometriosis, STI's, Abnormal pap smears   PMHx: Denies  PSHx: Denies  Meds: None  Allergies: NKDA  FHx: No pertinent family history in first degree relatives    Vital Signs Last 24 Hrs  T(C): 36.8 (27 Sep 2021 22:56), Max: 36.8 (27 Sep 2021 21:05)  T(F): 98.2 (27 Sep 2021 22:56), Max: 98.3 (27 Sep 2021 21:05)  HR: 101 (27 Sep 2021 22:56) (99 - 101)  BP: 123/70 (27 Sep 2021 22:56) (119/75 - 123/70)  BP(mean): --  RR: 16 (27 Sep 2021 22:56) (16 - 18)  SpO2: 100% (27 Sep 2021 22:56) (98% - 100%)    Physical Exam:   General: sitting comfortably in bed, NAD   CV: RRR  Lungs: CTAB  Abd: Soft,  non-distended. LLQ tenderness, no guarding/rebound   : Minimal bleeding on pad. External labia wnl. Declines pelvic/speculum exam  Ext: non-tender b/l, no edema     LABS:    HCG Quantitative, Serum: 4779.0               12.2   7.96  )-----------( 261      ( 27 Sep 2021 23:52 )             36.2     -  139  |  105  |  18  ----------------------------<  103<H>  3.3<L>   |  22  |  0.94    Ca    9.4      27 Sep 2021 23:52  TPro  6.4  /  Alb  4.0  /  TBili  0.2  /  DBili  x   /  AST  8<L>  /  ALT  12  /  AlkPhos  94  -  PT/INR - ( 27 Sep 2021 23:52 )   PT: 13.5 sec;   INR: 1.13 ratio    PTT - ( 27 Sep 2021 23:52 )  PTT:27.6 sec    RADIOLOGY & ADDITIONAL STUDIES:  < from: US Transvaginal, OB (21 @ 01:40) >  EXAM:  US DPLX PELVIC                        EXAM:  US OB TRANSVAGINAL                        PROCEDURE DATE:  2021    INTERPRETATION:  CLINICAL INFORMATION: Vaginal bleeding, left pelvic pain, beta hCG 4779, evaluate for ectopic.  LMP: 08/10/2021  Estimated Gestational Age by LMP: 7 weeks 0 days  COMPARISON: Pelvic ultrasound 2010  Endovaginal pelvic sonogram only. Color and Spectral Doppler was performed.  FINDINGS:  Uterus: No intrauterine gestation.  An ectopic pregnancy measuring 2.3 x 1.4 x 1.1 cm is seen in the left adnexa:  Crown Rump Length: 3.7 mm  Estimated Gestational Age: 6 weeks 0 days by crown rump length.  Yolk Sac: Normal  Fetal Heart Rate: Question of slow cardiac activity, however unable to accurately measure.  Right ovary: 2.2 x 1.3 x 2.1 cm. Within normal limits. Normal arterial and venous waveforms.  Left ovary: 2.8 x 1.9 x 2.5 cm. Within normal limits. Normal arterial and venous waveforms.  Fluid: None.  IMPRESSION:  Leftadnexal ectopic pregnancy. Crown-rump length of 3.7 mm consistent with a gestational age of 6 weeks 0 days. Question of cardiac motion, however accurate fetal heart rate could not be obtained.  These findings were discussed with Dr. Mckeon on 2021 at 2:05 AM by Dr. Peres with read back confirmation.  --- End of Report ---    JESUS PERES MD; Resident Radiology  This document has been electronically signed.  GERRY LOPEZ MD; Attending Radiologist  This document has been electronically signed. Sep 28 2021  2:25AM  < end of copied text >

## 2021-09-28 NOTE — CONSULT NOTE ADULT - ATTENDING COMMENTS
ATTG note    Pt seen with and agree with above PGY2 note    Pt is a 37 yo P3 @ approx 7 wks by dates and h/o left ectopic in the past now presents to ED from private GYN for concern for abnormal rising HCG after +UCG and lower bad pain with spotting.  Pt being followed in the office for +UCG and HCG values.  Values plateaued and u.s in office showed possible left ectopic.    VSS  Gen-NAD  ABd-soft, nd, TTP in LLQ only, no rebound no guarding  Pelvic - pt declined internal exam    Labs-h/h-12/36, HCG- 4779, MBT-B+, COVID neg  TVS-no IUP, Left ecopic 2.3 cm measuring 6 wks, +YS, ? FH, no FF    37 yo P3 with h/o left ectopic in the past s/p MTX tx now with abnormal HCG values and no IUP and left adnexal mass all c/w left ectopic again.  d/w pt mngt options including observation, medical mgt with MTX, or surgical mngt with left salpingectomy.  Pt opted for medical mgt.  Pt with no medical or lab contraindications to MTX and pt is known to [address] [place] [address] plans to f/u there.  Pt with tenderness on exam but no signs of acute abdomen but pt also refused a pelvic exam to confirm peritoneal signs since pt had exam by private GYN.  d/w pt concern for relative contraindications - HCG close to 5K and concern for FH.  Pt reported that prior ectopic has smilar presentation and MTX was successful.  REcommended 2-dose regimen of MTX to increase success rate but pt declined this.  Pt adamant about not wanting surgical intervention and losing her tube.  Pt preferred to do HSG to assess integrity of fallopian tubes after resolution of this pregnancy.  risks of MTX d/w pt including failure and need for either rpt dose or surgery, increased pain, oral ulcers.  Pt aware of the follow up on Day 4 and Day 7 and plans to return to [address] [place] [address],  - mg IM x 1 now in ED, BSA 1.9  -f/u in [address] [place] [address] on 10/2 or in ED for Day 4 HCG  -worsening pain or hemodynamically unstable - to come to ED    BRIE Silva

## 2021-09-28 NOTE — CONSULT NOTE ADULT - ASSESSMENT
37y/o  @7w0d (by LMP 8/10/2021) presenting to the ED from the office for rule out ectopic pregnancy. Patient is stable condition. VSS, H/H 12.2/36.2, mild abdominal pain, afebrile.   - Patient fully counseled about medical (methotrexate therapy) versus surgical management for non-ruptured ectopic pregnancy.  Understanding risks and benefits she desires medical management. Given bHCG of 4779 and questionable heart beat, patient offered the two-dose regimen of MTX. Patient declines at this time, to discuss w/ Dr. Hayden.  Patient denies history of renal, hepatic or pulmonary disease.  No history of anemia or leukemia, alcohol abuse, peptic ulcer disease, or immunodeficiency  Not currently breastfeeding.  Reports prior methotrexate use w/o adverse reaction to methotrexate.  Patient is confident that she can follow up for all necessary repeat bHCG testing.  Understands that she may need repeat dose of methotrexate or surgery following today's dose of methotrexate.  Bleeding and pain precautions discussed, patient to present to emergency department immediately for any acute symptoms.   - Ectopic precautions reviewed, pt advised to return to the ED if she experiences intense abdominal pain, vomiting inability to tolerate PO.   - Strict return precautions reviewed including saturating pads (more than one an hour for two hours), passing large clots, or any signs or symptoms of anemia   - Consents signed, patient information sheet reviewed, order for MTX input by Dr. Silva    Seen w/ Dr. Silva, attending physician  CHARLA Brandt, PGY-2

## 2021-10-01 ENCOUNTER — NON-APPOINTMENT (OUTPATIENT)
Age: 36
End: 2021-10-01

## 2021-10-01 DIAGNOSIS — O00.90 UNSPECIFIED. ECTOPIC. PREGNANCY WITHOUT INTRAUTERINE PREGNANCY: ICD-10-CM

## 2021-10-03 LAB — HCG SERPL-MCNC: 4701 MIU/ML

## 2021-10-04 VITALS
TEMPERATURE: 99 F | SYSTOLIC BLOOD PRESSURE: 163 MMHG | HEIGHT: 65 IN | HEART RATE: 103 BPM | RESPIRATION RATE: 18 BRPM | WEIGHT: 173.06 LBS | OXYGEN SATURATION: 99 % | DIASTOLIC BLOOD PRESSURE: 99 MMHG

## 2021-10-04 NOTE — CHART NOTE - NSCHARTNOTEFT_GEN_A_CORE
OB/GYN Contact Note    I called the patient to confirm that she plans to come to the ED today to follow-up her Day 7  bHCG status post MTX. The patient answered the phone and confirmed she will be coming after her children are done with tutoring, around 9pm.    bHCG Quantitative, Serum trend: 4779 (9/27/21, Day 1) > 4701 (10/1/21, Day 4)       Juliana Burton MD  OBGYN PGY3

## 2021-10-04 NOTE — ED ADULT TRIAGE NOTE - NSWEIGHTCALCTOOLDRUG_GEN_A_CORE
used

## 2021-10-04 NOTE — ED ADULT TRIAGE NOTE - WEIGHT IN LBS
173
Quality 431: Preventive Care And Screening: Unhealthy Alcohol Use - Screening: Patient screened for unhealthy alcohol use using a single question and scores less than 2 times per year
Quality 110: Preventive Care And Screening: Influenza Immunization: Influenza Immunization previously received during influenza season
Detail Level: Detailed
Quality 394b: Td/Tdap Immunizations For Adolescents: Patient had one tetanus, diphtheria toxoids vaccine (Td) on or between the patient's 10th and 13th birthdays.
Quality 402: Tobacco Use And Help With Quitting Among Adolescents: Patient screened for tobacco and never smoked

## 2021-10-05 ENCOUNTER — EMERGENCY (EMERGENCY)
Facility: HOSPITAL | Age: 36
LOS: 1 days | Discharge: HOME | End: 2021-10-05
Attending: STUDENT IN AN ORGANIZED HEALTH CARE EDUCATION/TRAINING PROGRAM
Payer: MEDICARE

## 2021-10-05 VITALS
DIASTOLIC BLOOD PRESSURE: 70 MMHG | SYSTOLIC BLOOD PRESSURE: 129 MMHG | RESPIRATION RATE: 20 BRPM | HEART RATE: 93 BPM | TEMPERATURE: 99 F | OXYGEN SATURATION: 98 %

## 2021-10-05 LAB
ALBUMIN SERPL ELPH-MCNC: 4.1 G/DL — SIGNIFICANT CHANGE UP (ref 3.3–5)
ALP SERPL-CCNC: 90 U/L — SIGNIFICANT CHANGE UP (ref 40–120)
ALT FLD-CCNC: 32 U/L — SIGNIFICANT CHANGE UP (ref 10–45)
ANION GAP SERPL CALC-SCNC: 15 MMOL/L — SIGNIFICANT CHANGE UP (ref 5–17)
APTT BLD: 28.2 SEC — SIGNIFICANT CHANGE UP (ref 27.5–35.5)
AST SERPL-CCNC: 14 U/L — SIGNIFICANT CHANGE UP (ref 10–40)
BASOPHILS # BLD AUTO: 0.04 K/UL — SIGNIFICANT CHANGE UP (ref 0–0.2)
BASOPHILS NFR BLD AUTO: 0.6 % — SIGNIFICANT CHANGE UP (ref 0–2)
BILIRUB SERPL-MCNC: <0.1 MG/DL — LOW (ref 0.2–1.2)
BLD GP AB SCN SERPL QL: NEGATIVE — SIGNIFICANT CHANGE UP
BUN SERPL-MCNC: 14 MG/DL — SIGNIFICANT CHANGE UP (ref 7–23)
CALCIUM SERPL-MCNC: 9.1 MG/DL — SIGNIFICANT CHANGE UP (ref 8.4–10.5)
CHLORIDE SERPL-SCNC: 105 MMOL/L — SIGNIFICANT CHANGE UP (ref 96–108)
CO2 SERPL-SCNC: 20 MMOL/L — LOW (ref 22–31)
CREAT SERPL-MCNC: 0.73 MG/DL — SIGNIFICANT CHANGE UP (ref 0.5–1.3)
EOSINOPHIL # BLD AUTO: 0.12 K/UL — SIGNIFICANT CHANGE UP (ref 0–0.5)
EOSINOPHIL NFR BLD AUTO: 1.9 % — SIGNIFICANT CHANGE UP (ref 0–6)
GLUCOSE SERPL-MCNC: 95 MG/DL — SIGNIFICANT CHANGE UP (ref 70–99)
HCG SERPL-ACNC: 5147 MIU/ML — HIGH
HCT VFR BLD CALC: 35.3 % — SIGNIFICANT CHANGE UP (ref 34.5–45)
HGB BLD-MCNC: 12 G/DL — SIGNIFICANT CHANGE UP (ref 11.5–15.5)
IMM GRANULOCYTES NFR BLD AUTO: 0.3 % — SIGNIFICANT CHANGE UP (ref 0–1.5)
INR BLD: 1 RATIO — SIGNIFICANT CHANGE UP (ref 0.88–1.16)
LYMPHOCYTES # BLD AUTO: 2.99 K/UL — SIGNIFICANT CHANGE UP (ref 1–3.3)
LYMPHOCYTES # BLD AUTO: 46.2 % — HIGH (ref 13–44)
MCHC RBC-ENTMCNC: 31.9 PG — SIGNIFICANT CHANGE UP (ref 27–34)
MCHC RBC-ENTMCNC: 34 GM/DL — SIGNIFICANT CHANGE UP (ref 32–36)
MCV RBC AUTO: 93.9 FL — SIGNIFICANT CHANGE UP (ref 80–100)
MONOCYTES # BLD AUTO: 0.61 K/UL — SIGNIFICANT CHANGE UP (ref 0–0.9)
MONOCYTES NFR BLD AUTO: 9.4 % — SIGNIFICANT CHANGE UP (ref 2–14)
NEUTROPHILS # BLD AUTO: 2.69 K/UL — SIGNIFICANT CHANGE UP (ref 1.8–7.4)
NEUTROPHILS NFR BLD AUTO: 41.6 % — LOW (ref 43–77)
NRBC # BLD: 0 /100 WBCS — SIGNIFICANT CHANGE UP (ref 0–0)
PLATELET # BLD AUTO: 276 K/UL — SIGNIFICANT CHANGE UP (ref 150–400)
POTASSIUM SERPL-MCNC: 3.6 MMOL/L — SIGNIFICANT CHANGE UP (ref 3.5–5.3)
POTASSIUM SERPL-SCNC: 3.6 MMOL/L — SIGNIFICANT CHANGE UP (ref 3.5–5.3)
PROT SERPL-MCNC: 6.6 G/DL — SIGNIFICANT CHANGE UP (ref 6–8.3)
PROTHROM AB SERPL-ACNC: 12 SEC — SIGNIFICANT CHANGE UP (ref 10.6–13.6)
RBC # BLD: 3.76 M/UL — LOW (ref 3.8–5.2)
RBC # FLD: 12.9 % — SIGNIFICANT CHANGE UP (ref 10.3–14.5)
RH IG SCN BLD-IMP: POSITIVE — SIGNIFICANT CHANGE UP
SODIUM SERPL-SCNC: 140 MMOL/L — SIGNIFICANT CHANGE UP (ref 135–145)
WBC # BLD: 6.47 K/UL — SIGNIFICANT CHANGE UP (ref 3.8–10.5)
WBC # FLD AUTO: 6.47 K/UL — SIGNIFICANT CHANGE UP (ref 3.8–10.5)

## 2021-10-05 PROCEDURE — 93975 VASCULAR STUDY: CPT | Mod: 26

## 2021-10-05 PROCEDURE — 76815 OB US LIMITED FETUS(S): CPT | Mod: 26

## 2021-10-05 PROCEDURE — 86900 BLOOD TYPING SEROLOGIC ABO: CPT

## 2021-10-05 PROCEDURE — 85610 PROTHROMBIN TIME: CPT

## 2021-10-05 PROCEDURE — 86901 BLOOD TYPING SEROLOGIC RH(D): CPT

## 2021-10-05 PROCEDURE — 84702 CHORIONIC GONADOTROPIN TEST: CPT

## 2021-10-05 PROCEDURE — 80053 COMPREHEN METABOLIC PANEL: CPT

## 2021-10-05 PROCEDURE — 86850 RBC ANTIBODY SCREEN: CPT

## 2021-10-05 PROCEDURE — 99291 CRITICAL CARE FIRST HOUR: CPT

## 2021-10-05 PROCEDURE — 76817 TRANSVAGINAL US OBSTETRIC: CPT | Mod: 26

## 2021-10-05 PROCEDURE — 76817 TRANSVAGINAL US OBSTETRIC: CPT

## 2021-10-05 PROCEDURE — 93975 VASCULAR STUDY: CPT

## 2021-10-05 PROCEDURE — 85730 THROMBOPLASTIN TIME PARTIAL: CPT

## 2021-10-05 PROCEDURE — 99282 EMERGENCY DEPT VISIT SF MDM: CPT

## 2021-10-05 PROCEDURE — 96372 THER/PROPH/DIAG INJ SC/IM: CPT

## 2021-10-05 PROCEDURE — 85025 COMPLETE CBC W/AUTO DIFF WBC: CPT

## 2021-10-05 PROCEDURE — 99291 CRITICAL CARE FIRST HOUR: CPT | Mod: 25

## 2021-10-05 RX ORDER — METHOTREXATE 2.5 MG/1
100 TABLET ORAL ONCE
Refills: 0 | Status: COMPLETED | OUTPATIENT
Start: 2021-10-05 | End: 2021-10-05

## 2021-10-05 RX ORDER — ACETAMINOPHEN 500 MG
975 TABLET ORAL ONCE
Refills: 0 | Status: COMPLETED | OUTPATIENT
Start: 2021-10-05 | End: 2021-10-05

## 2021-10-05 RX ADMIN — METHOTREXATE 100 MILLIGRAM(S): 2.5 TABLET ORAL at 03:14

## 2021-10-05 RX ADMIN — Medication 975 MILLIGRAM(S): at 01:04

## 2021-10-05 RX ADMIN — Medication 975 MILLIGRAM(S): at 03:32

## 2021-10-05 NOTE — ED ADULT NURSE REASSESSMENT NOTE - NS ED NURSE REASSESS COMMENT FT1
Pt d/c from ED, verbalizes understanding of risks of getting methotrexate vs the recommended surgery. Pt verbalizes with RN and MD in room that she will return to ED on friday to rpt HCG levels as instructed. Patient denies any current complaints. VSS/NAD.

## 2021-10-05 NOTE — ED PROVIDER NOTE - PROGRESS NOTE DETAILS
Attending MD Lopez : Spoke with US tech. QUestion increased FHM. OBGYN made aware. In light of elevated hcg, will send preops. OBGYN to d/w patient. Patient hemodynamically stable at this time. Comfortbale.

## 2021-10-05 NOTE — CONSULT NOTE ADULT - ATTENDING COMMENTS
ATTG note    Pt seen with and agree with above PGY2 note    Pt known by GYN service.  Pt is a 37 yo P3 @ 8 wks by date with known left ectopic pregnancy diagnosed last week for inappropriately rising Beta and left adnexal lesion and treated with MTX.   Pt returns today as a Day 8 follow up.  Pt received standard weight based MTX single dose.  Pt reports LLQ pain is still present but not constant nor worsening.  Vag spotting has resolved.  No other sxs reported,  When counseled last week, recommendation for possible 2-dose MTX regimen but pt declined at that time.    VS-elevated BP, pulse 103  Gen-NAD  Abd-soft, Nd, TTP in LLQ, no rebound no guarding    Lasb  H/H-12/26 (9/28) to 12/35 (10/4)  HCG-4779 (Day 1) to 5147 (day 8)  TVS - pending official report, reviewed slides myself    a/p:37 yo P3 with known left ectopic s/p MTX single dose tx for Day 8 follow up.  Pt with no worsening sxs.  No signs of acute abdomen.  HCG with inappropriate response to MTX.  TVS - no signs of rupture prelim review.  AT this time, pt with failed medical mngt of MTX.  D/w pt concern about no drop in HCG level and recommend surgical definitive mngt  in context of no change in HCG and still with LLQ pain even though no u/s findings nor exam c/w rupture.  Pt again adamant that surgery is a "last resort" and declined surgery again.  Pt desires another trial for MTX and will consider a 2-dose regimen this time.  Pt with no absolute contraindication to medical mngt even though would this team prefers to provide surgical mngt.  Pt still aware of possible rupture that can occur despite tx and would need emergent surgery.  Pt also aware of possible need for surgery if MTX again fails.  -Consents signed again  - mg IM today in ED  -Pt to return to ED in the AM on 10/8 for Day 4 HCG and for possible additional MTX dose of 100 mg IM and then return on 10/11 for Day 7 HCG  -ED precautions again reviewed - severe pain, bleeding, lightheadedness, dizziness    BRIE Silav

## 2021-10-05 NOTE — ED PROVIDER NOTE - PATIENT PORTAL LINK FT
You can access the FollowMyHealth Patient Portal offered by [facility] by registering at the following website: [email or web address]. By joining [email or web address]’s FollowMyHealth portal, you will also be able to view your health information using other applications (apps) compatible with our system.

## 2021-10-05 NOTE — CONSULT NOTE ADULT - SUBJECTIVE AND OBJECTIVE BOX
Gyn Consult Note  HAN WHEAT  36y  Female [id number]    HPI: 37y/o  @8w0d (by LMP 8/10/2021) presenting to the ED for follow up bHCG for known ectopic pregnancy. Patient is s/p methotrexate for L ectopic pregnancy, presenting for the day 7 bHCG level.   Patient reports continued LLQ tenderss, similar to last visit. She denies N/V, dizziness, SOB, CP, urinary symptoms, changes in bowel habits.    bHCG trend: 4770 () ->MTX () -> 4701 (10/1)    Name of GYN Physician: Dr. Hayden    OBHx:    -  x3 (, , )  - L ectopic pregnancy s/p MTX (~)  GYNHx: Denies fibroids, cysts, endometriosis, STI's, Abnormal pap smears   PMHx: Denies  PSHx: Denies  Meds: None  Allergies: NKDA  FHx: No pertinent family history in first degree relatives    Vital Signs Last 24 Hrs  T(C): 37 (04 Oct 2021 23:32), Max: 37 (04 Oct 2021 23:32)  T(F): 98.6 (04 Oct 2021 23:32), Max: 98.6 (04 Oct 2021 23:32)  HR: 103 (04 Oct 2021 23:32) (103 - 103)  BP: 163/99 (04 Oct 2021 23:32) (163/99 - 163/99)  BP(mean): --  RR: 18 (04 Oct 2021 23:32) (18 - 18)  SpO2: 99% (04 Oct 2021 23:32) (99% - 99%)    Physical Exam:   General: sitting comfortably in bed, NAD   CV: RRR  Lungs: CTAB  Abd: Soft, non-tender, non-distended.  Ext: non-tender b/l, no edema     LABS:      RADIOLOGY & ADDITIONAL STUDIES:      < from: US Doppler Pelvis (21 @ 01:40) >  EXAM:  US DPLX PELVIC                        EXAM:  US OB TRANSVAGINAL                        PROCEDURE DATE:  2021    INTERPRETATION:  CLINICAL INFORMATION: Vaginal bleeding, left pelvic pain, beta hCG 4779, evaluate for ectopic.  LMP: 08/10/2021  Estimated Gestational Age by LMP: 7 weeks 0 days  COMPARISON: Pelvic ultrasound 2010  Endovaginal pelvic sonogram only. Color and Spectral Doppler was performed.  FINDINGS:  Uterus: No intrauterine gestation.  An ectopic pregnancy measuring 2.3 x 1.4 x 1.1 cm is seen in the left adnexa:  Crown Rump Length: 3.7 mm  Estimated Gestational Age: 6 weeks 0 days by crown rump length.  Yolk Sac: Normal  Fetal Heart Rate: Question of slow cardiac activity, however unable to accurately measure.  Right ovary: 2.2 x 1.3 x 2.1 cm. Within normal limits. Normal arterial and venous waveforms.  Left ovary: 2.8 x 1.9 x 2.5 cm. Within normal limits. Normal arterial and venous waveforms.  Fluid: None.  IMPRESSION:  Leftadnexal ectopic pregnancy. Crown-rump length of 3.7 mm consistent with a gestational age of 6 weeks 0 days. Question of cardiac motion, however accurate fetal heart rate could not be obtained.  These findings were discussed with Dr. Mckeon on 2021 at 2:05 AM by Dr. Peres with read back confirmation.  --- End of Report ---    JESUS PERES MD; Resident Radiology  This document has been electronically signed.  GERRY LOPEZ MD; Attending Radiologist  This document has been electronically signed. Sep 28 2021  2:25AM  < end of copied text >   Gyn Consult Note  HAN WHEAT  36y  Female [id number]    HPI: 37y/o  @8w0d (by LMP 8/10/2021) presenting to the ED for follow up bHCG for known ectopic pregnancy. Patient is s/p methotrexate for L ectopic pregnancy, presenting for the day 7 bHCG level.   Patient reports continued LLQ tenderness, similar to last visit. She reports vaginal bleeding has resolved. She denies N/V, dizziness, SOB, CP, urinary symptoms, changes in bowel habits.    bHCG trend: 4770 () ->MTX () -> 4701 (10/1)    Name of GYN Physician: Dr. Hayden    OBHx:    -  x3 (, , )  - L ectopic pregnancy s/p MTX (~)  GYNHx: Denies fibroids, cysts, endometriosis, STI's, Abnormal pap smears   PMHx: Denies  PSHx: Denies  Meds: None  Allergies: NKDA  FHx: No pertinent family history in first degree relatives    Vital Signs Last 24 Hrs  T(C): 37 (04 Oct 2021 23:32), Max: 37 (04 Oct 2021 23:32)  T(F): 98.6 (04 Oct 2021 23:32), Max: 98.6 (04 Oct 2021 23:32)  HR: 103 (04 Oct 2021 23:32) (103 - 103)  BP: 163/99 (04 Oct 2021 23:32) (163/99 - 163/99)  BP(mean): --  RR: 18 (04 Oct 2021 23:32) (18 - 18)  SpO2: 99% (04 Oct 2021 23:32) (99% - 99%)    Physical Exam:   General: sitting comfortably in bed, NAD   CV: RRR  Lungs: CTAB  Abd: Soft, non-tender, non-distended.  Ext: non-tender b/l, no edema     LABS:             12.0   6.47  )-----------( 276      ( 05 Oct 2021 01:17 )             35.3     10-05  140  |  105  |  14  ----------------------------<  95  3.6   |  20<L>  |  0.73    Ca    9.1      05 Oct 2021 01:17  TPro  6.6  /  Alb  4.1  /  TBili  <0.1<L>  /  DBili  x   /  AST  14  /  ALT  32  /  AlkPhos  90  10-05      RADIOLOGY & ADDITIONAL STUDIES:      < from: US Doppler Pelvis (21 @ 01:40) >  EXAM:  US DPLX PELVIC                        EXAM:  US OB TRANSVAGINAL                        PROCEDURE DATE:  2021    INTERPRETATION:  CLINICAL INFORMATION: Vaginal bleeding, left pelvic pain, beta hCG 4779, evaluate for ectopic.  LMP: 08/10/2021  Estimated Gestational Age by LMP: 7 weeks 0 days  COMPARISON: Pelvic ultrasound 2010  Endovaginal pelvic sonogram only. Color and Spectral Doppler was performed.  FINDINGS:  Uterus: No intrauterine gestation.  An ectopic pregnancy measuring 2.3 x 1.4 x 1.1 cm is seen in the left adnexa:  Crown Rump Length: 3.7 mm  Estimated Gestational Age: 6 weeks 0 days by crown rump length.  Yolk Sac: Normal  Fetal Heart Rate: Question of slow cardiac activity, however unable to accurately measure.  Right ovary: 2.2 x 1.3 x 2.1 cm. Within normal limits. Normal arterial and venous waveforms.  Left ovary: 2.8 x 1.9 x 2.5 cm. Within normal limits. Normal arterial and venous waveforms.  Fluid: None.  IMPRESSION:  Leftadnexal ectopic pregnancy. Crown-rump length of 3.7 mm consistent with a gestational age of 6 weeks 0 days. Question of cardiac motion, however accurate fetal heart rate could not be obtained.  These findings were discussed with Dr. Mckeon on 2021 at 2:05 AM by Dr. Peres with read back confirmation.  --- End of Report ---    JESUS PERES MD; Resident Radiology  This document has been electronically signed.  GERRY LOPEZ MD; Attending Radiologist  This document has been electronically signed. Sep 28 2021  2:25AM  < end of copied text >   Gyn Consult Note  HAN WHEAT  36y  Female [id number]    HPI: 37y/o  @8w0d (by LMP 8/10/2021) presenting to the ED for follow up bHCG for known ectopic pregnancy. Patient is s/p methotrexate for L ectopic pregnancy, presenting for the day 7 bHCG level. Patient reports continued LLQ tenderness, similar to last visit. She reports vaginal bleeding has resolved. She denies N/V, dizziness, SOB, CP, urinary symptoms, changes in bowel habits.    bHCG trend: 4770 () ->MTX () -> 4701 (10/1)    Name of GYN Physician: Dr. Hayden    OBHx:    -  x3 (, , )  - L ectopic pregnancy s/p MTX (~)  GYNHx: Denies fibroids, cysts, endometriosis, STI's, Abnormal pap smears   PMHx: Denies  PSHx: Denies  Meds: None  Allergies: NKDA  FHx: No pertinent family history in first degree relatives    Vital Signs Last 24 Hrs  T(C): 37 (04 Oct 2021 23:32), Max: 37 (04 Oct 2021 23:32)  T(F): 98.6 (04 Oct 2021 23:32), Max: 98.6 (04 Oct 2021 23:32)  HR: 103 (04 Oct 2021 23:32) (103 - 103)  BP: 163/99 (04 Oct 2021 23:32) (163/99 - 163/99)  BP(mean): --  RR: 18 (04 Oct 2021 23:32) (18 - 18)  SpO2: 99% (04 Oct 2021 23:32) (99% - 99%)    Physical Exam:   General: sitting comfortably in bed, NAD   CV: RRR  Lungs: CTAB  Abd: Soft, non-tender, non-distended.  Ext: non-tender b/l, no edema     LABS:             12.0   6.47  )-----------( 276      ( 05 Oct 2021 01:17 )             35.3     10-05  140  |  105  |  14  ----------------------------<  95  3.6   |  20<L>  |  0.73    Ca    9.1      05 Oct 2021 01:17  TPro  6.6  /  Alb  4.1  /  TBili  <0.1<L>  /  DBili  x   /  AST  14  /  ALT  32  /  AlkPhos  90  10-05      RADIOLOGY & ADDITIONAL STUDIES:  < from: US Transvaginal, OB (10.05.21 @ 02:02) >  EXAM:  US DPLX PELVIC                        EXAM:  US OB TRANSVAGINAL                        PROCEDURE DATE:  10/05/2021    INTERPRETATION:  CLINICAL INFORMATION: 36-year-old female, left adnexal ectopic pregnancy, evaluation for rupture. Beta hCG 5147, previously 4779.  LMP: 8/10/2021  Estimated Gestational Age by LMP: 8 weeks 0 days  COMPARISON: Pelvic sonogram 2021.  Endovaginal and transabdominal pelvic sonogram.  FINDINGS:  Uterus: Retroverted. 8.9 x 5.4 x 6.9cm. Endometrium measures 4 mm in thickness. No gestational sac identified. Trace endocervical fluid noted.  2.3 x 1.4 x 1.7 cm cystic structure in the left adnexa is again noted, containing a fetal pole and large yolk sac.  Crown Rump Length: 4.6mm  Estimated Gestational Age: 6 weeks 1 day by crown-rump length.  Fetal Heart Rate: Slow fetal motion is again noted.  Right ovary: 2.2 x 0.8 x 1.4 cm. Within normal limits. Flow identified in the ovary.  Left ovary: 2.9 x 1.7 x 2.2 cm. 1.9 cm grossly. Flow identified in the ovary.  Fluid: None.  IMPRESSION:  Revisualized left adnexal pregnancy containing a fetal pole demonstrating slow cardiac activity.  --- End of Report ---    CARROLL OVALLE MD; Resident Radiologist  This document has been electronically signed.  TRIPP CHAMPAGNE MD; Attending Radiologist  This document has been electronically signed. Oct  5 2021  3:27AM  < end of copied text >      < from: US Doppler Pelvis (21 @ 01:40) >  EXAM:  US DPLX PELVIC                        EXAM:  US OB TRANSVAGINAL                        PROCEDURE DATE:  2021    INTERPRETATION:  CLINICAL INFORMATION: Vaginal bleeding, left pelvic pain, beta hCG 4779, evaluate for ectopic.  LMP: 08/10/2021  Estimated Gestational Age by LMP: 7 weeks 0 days  COMPARISON: Pelvic ultrasound 2010  Endovaginal pelvic sonogram only. Color and Spectral Doppler was performed.  FINDINGS:  Uterus: No intrauterine gestation.  An ectopic pregnancy measuring 2.3 x 1.4 x 1.1 cm is seen in the left adnexa:  Crown Rump Length: 3.7 mm  Estimated Gestational Age: 6 weeks 0 days by crown rump length.  Yolk Sac: Normal  Fetal Heart Rate: Question of slow cardiac activity, however unable to accurately measure.  Right ovary: 2.2 x 1.3 x 2.1 cm. Within normal limits. Normal arterial and venous waveforms.  Left ovary: 2.8 x 1.9 x 2.5 cm. Within normal limits. Normal arterial and venous waveforms.  Fluid: None.  IMPRESSION:  Leftadnexal ectopic pregnancy. Crown-rump length of 3.7 mm consistent with a gestational age of 6 weeks 0 days. Question of cardiac motion, however accurate fetal heart rate could not be obtained.  These findings were discussed with Dr. Mckeon on 2021 at 2:05 AM by Dr. Peres with read back confirmation.  --- End of Report ---    JESUS PERES MD; Resident Radiology  This document has been electronically signed.  GERRY LOPEZ MD; Attending Radiologist  This document has been electronically signed. Sep 28 2021  2:25AM  < end of copied text >

## 2021-10-05 NOTE — CONSULT NOTE ADULT - ASSESSMENT
35y/o  @8w0d (by LMP 8/10/2021) w/ left ectopic pregnancy s/p methotrexate presenting to the ED for day 7 bHCG. Patient currently in stable condition, VSS, abdominal exam notable for left lower quadrant pain, no rebound or guarding.   ********PRELIMINARY RECOMMENDATIONS********   - [facility] [place]  - GHADA Brandt, PGY-2  37y/o  @8w0d (by LMP 8/10/2021) w/ left ectopic pregnancy s/p methotrexate presenting to the ED for day 7 bHCG. Patient currently in stable condition, VSS, abdominal exam notable for left lower quadrant pain, soft, +BS, no rebound or guarding.     - bHCG uptrendin () ->MTX () -> 4701 (10/1, day 4) -> 5147 (10/5, day 8)  - TVUS re-demonstrates left ectopic pregnancy  - Patient fully counseled about medical (methotrexate therapy) versus surgical management for non-ruptured ectopic pregnancy.  Understanding risks and benefits she desires repeat MTX for medical management. Given increasing bHCG to 5147 and multiple relative contraindications, patient advised that surgical management is the most appropriate at this time. Patient declines at this time, is not interested in surgery.  Patient denies history of renal, hepatic or pulmonary disease.  No history of anemia or leukemia, alcohol abuse, peptic ulcer disease, or immunodeficiency  Not currently breastfeeding.  Reports prior methotrexate use w/o adverse reaction to methotrexate.  Patient is confident that she can follow up for all necessary repeat bHCG testing.  Understands that she may need surgery following today's dose of methotrexate.  Bleeding and pain precautions discussed, patient to present to emergency department immediately for any acute symptoms.   - Ectopic precautions reviewed, pt advised to return to the ED if she experiences intense abdominal pain, vomiting inability to tolerate PO.   - Strict return precautions reviewed including saturating pads (more than one an hour for two hours), passing large clots, or any signs or symptoms of anemia   - Consents signed, patient information sheet reviewed, order for MTX input by Dr. Silva    Seen w/ Dr. Ricardo Brandt, PGY-2  37y/o  @8w0d (by LMP 8/10/2021) w/ left ectopic pregnancy s/p methotrexate presenting to the ED for day 7 bHCG. Patient currently in stable condition, VSS, abdominal exam notable for left lower quadrant pain, soft, +BS, no rebound or guarding.     - bHCG uptrendin () ->MTX () -> 4701 (10/1, day 4) -> 5147 (10/5, day 8)  - TVUS re-demonstrates left ectopic pregnancy  - Patient fully counseled about medical (methotrexate therapy) versus surgical management for non-ruptured ectopic pregnancy.  Understanding risks and benefits she desires repeat MTX for medical management. Given increasing bHCG to 5147 and multiple relative contraindications, patient advised that surgical management is the most appropriate at this time. Patient declines at this time, is not interested in surgery.  Patient denies history of renal, hepatic or pulmonary disease.  No history of anemia or leukemia, alcohol abuse, peptic ulcer disease, or immunodeficiency  Not currently breastfeeding.  Reports prior methotrexate use w/o adverse reaction to methotrexate.  Patient is confident that she can follow up for all necessary repeat bHCG testing.  Understands that she may need surgery following today's dose of methotrexate.  Bleeding and pain precautions discussed, patient to present to emergency department immediately for any acute symptoms.   - Patient to return for day 4 bHCG on 10/8/2021  - Ectopic precautions reviewed, pt advised to return to the ED if she experiences intense abdominal pain, vomiting inability to tolerate PO.   - Strict return precautions reviewed including saturating pads (more than one an hour for two hours), passing large clots, or any signs or symptoms of anemia   - Consents signed, patient information sheet reviewed, order for MTX input by Dr. Silva    Seen w/ Dr. Ricardo Brandt, PGY-2

## 2021-10-05 NOTE — ED PROVIDER NOTE - NSFOLLOWUPINSTRUCTIONS_ED_ALL_ED_FT
PLEASE FOLLOW UP WITH THE OB/GYN DOCTORS. PLEASE CALL TO MAKE AN APPOINTMENT    PLEASE RETURN TO THE EMERGENCY DEPARTMENT ON FRIDAY 10/8/21 FOR ANOTHER DOSE OF METHOTREXATE.    PLEASE RETURN TO THE EMERGENCY DEPARTMENT FOR WORSENING OF YOUR SYMPTOMS, INCLUDING BUT NOT LIMITED TO SEVERE ABDOMINAL PAIN, SEVERE VAGINAL BLEEDING, YOU PASS OUT, SEVERE VOMITING.

## 2021-10-05 NOTE — ED ADULT NURSE NOTE - OBJECTIVE STATEMENT
36 y [place] [facility] [place] was seen in Ed for "left sided ectopic pregnancy," was given Methotrexate. Was referred back to ED for left sided abdominal pain and blood work. Denies fevers, chills, CP, SOB. LMP 8/10. Reports to minimal vaginal bleeding which stopped yesterday x 1 week. A&Ox3. Skin warm and dry. Breathing comfortably in bed- no distress. Safety maintained- bed locked in lowest position.

## 2021-10-05 NOTE — ED PROVIDER NOTE - OBJECTIVE STATEMENT
Attending MD Lopez : 35y/o  @8w0d (by LMP 8/10/2021) presenting to the ED for follow up bHCG for known ectopic pregnancy. Patient is s/p methotrexate for L ectopic pregnancy, presenting for the day 7 bHCG level.   Patient reports continued LLQ pain, similar to last visit. States that the abdominal pain is actually worse today compared to prior. Feels it worst with movement or bending. She denies N/V, dizziness, SOB, CP, urinary symptoms, changes in bowel habits. No vaginal bleeding, discharge.

## 2021-10-05 NOTE — ED PROVIDER NOTE - ATTENDING CONTRIBUTION TO CARE
I have personally seen and examined this patient with the resident/fellow.  I have fully participated in the care of this patient. I have reviewed all pertinent clinical information, including history, physical exam, plan and the Resident/Fellow’s note and agree except as noted. See MDM I have personally seen and examined this patient with the resident/fellow.  I have fully participated in the care of this patient. I have reviewed all pertinent clinical information, including history, physical exam, plan and the Resident/Fellow’s note and agree except as noted. See MDM.

## 2021-10-05 NOTE — ED PROVIDER NOTE - PHYSICAL EXAMINATION
Attending MD Lopez : \  PHYSICAL EXAM:    GENERAL: NAD. Well appearing.   HEENT:  Atraumatic  CHEST/LUNG: Chest rise equal bilaterally. CTAB.   HEART: Regular rate and rhythm. No murmurs or rubs.   ABDOMEN: LLQ TTP. Patient declined bimanual exam at this time.   EXTREMITIES:  Extremities warm  PSYCH: A&Ox3  SKIN: No obvious rashes or lesions

## 2021-10-05 NOTE — ED PROVIDER NOTE - CLINICAL SUMMARY MEDICAL DECISION MAKING FREE TEXT BOX
Attending MD Lopez : 35y/o  @8w0d (by LMP 8/10/2021) presenting to the ED for follow up bHCG for known ectopic pregnancy. Patient is s/p methotrexate for L ectopic pregnancy, presenting for the day 7 bHCG level with worsening abdominal pain, mild tachycardia, and LLQ TTP c/f ruptured vs increasing size of ectopic. OBGYN made aware patient is here and consulted, TVUS and labs ordered. Patient currently comfortable and initially declining pain meds, but willing to take tylenol.

## 2021-10-08 ENCOUNTER — EMERGENCY (EMERGENCY)
Facility: HOSPITAL | Age: 36
LOS: 1 days | Discharge: HOME | End: 2021-10-08
Attending: EMERGENCY MEDICINE
Payer: MEDICARE

## 2021-10-08 VITALS
TEMPERATURE: 98 F | SYSTOLIC BLOOD PRESSURE: 113 MMHG | HEART RATE: 97 BPM | DIASTOLIC BLOOD PRESSURE: 72 MMHG | WEIGHT: 171.96 LBS | RESPIRATION RATE: 19 BRPM | OXYGEN SATURATION: 99 % | HEIGHT: 65 IN

## 2021-10-08 PROBLEM — O00.90 UNSPECIFIED ECTOPIC PREGNANCY WITHOUT INTRAUTERINE PREGNANCY: Chronic | Status: ACTIVE | Noted: 2021-10-05

## 2021-10-08 LAB
HCG SERPL-ACNC: 4529 MIU/ML — HIGH
HCT VFR BLD CALC: 35.5 % — SIGNIFICANT CHANGE UP (ref 34.5–45)
HGB BLD-MCNC: 12.3 G/DL — SIGNIFICANT CHANGE UP (ref 11.5–15.5)
MCHC RBC-ENTMCNC: 32.1 PG — SIGNIFICANT CHANGE UP (ref 27–34)
MCHC RBC-ENTMCNC: 34.6 GM/DL — SIGNIFICANT CHANGE UP (ref 32–36)
MCV RBC AUTO: 92.7 FL — SIGNIFICANT CHANGE UP (ref 80–100)
NRBC # BLD: 0 /100 WBCS — SIGNIFICANT CHANGE UP (ref 0–0)
PLATELET # BLD AUTO: 302 K/UL — SIGNIFICANT CHANGE UP (ref 150–400)
RBC # BLD: 3.83 M/UL — SIGNIFICANT CHANGE UP (ref 3.8–5.2)
RBC # FLD: 13 % — SIGNIFICANT CHANGE UP (ref 10.3–14.5)
WBC # BLD: 4.43 K/UL — SIGNIFICANT CHANGE UP (ref 3.8–10.5)
WBC # FLD AUTO: 4.43 K/UL — SIGNIFICANT CHANGE UP (ref 3.8–10.5)

## 2021-10-08 PROCEDURE — 99284 EMERGENCY DEPT VISIT MOD MDM: CPT

## 2021-10-08 PROCEDURE — 84702 CHORIONIC GONADOTROPIN TEST: CPT

## 2021-10-08 PROCEDURE — 99213 OFFICE O/P EST LOW 20 MIN: CPT

## 2021-10-08 PROCEDURE — 85027 COMPLETE CBC AUTOMATED: CPT

## 2021-10-08 RX ORDER — ACETAMINOPHEN 500 MG
975 TABLET ORAL ONCE
Refills: 0 | Status: DISCONTINUED | OUTPATIENT
Start: 2021-10-08 | End: 2021-10-08

## 2021-10-08 NOTE — ED ADULT NURSE NOTE - NSIMPLEMENTINTERV_GEN_ALL_ED
Implemented All Universal Safety Interventions:  [place] to call system. Call bell, personal items and telephone within reach. Instruct patient to call for assistance. Room bathroom lighting operational. Non-slip footwear when patient is off stretcher. Physically safe environment: no spills, clutter or unnecessary equipment. Stretcher in lowest position, wheels locked, appropriate side rails in place.

## 2021-10-08 NOTE — ED ADULT TRIAGE NOTE - CHIEF COMPLAINT QUOTE
Follow up for 2nd dose of methotrexate for ectopic pregnancy. Pt states she has not had any vaginal bleeding since 1st dose. Pt denies abd pain. DOROTHEA Hayden.

## 2021-10-08 NOTE — ED PROVIDER NOTE - CLINICAL SUMMARY MEDICAL DECISION MAKING FREE TEXT BOX
Patient with known ectopic presents reporting 2nd dose of methotrexate is required. Will evaluate HCG and discussed patient with OB/GYN who will also evaluate in the ED. Methotrexate to be ordered by OB/GYN if indicated. Will confirm appropriate f/u care. Patient stable in the ED without complaints. Patient with known ectopic presents reporting 2nd dose of methotrexate is required. Will evaluate HCG and discussed patient with OB/GYN who will also evaluate in the ED. Methotrexate to be ordered by OB/GYN if indicated. Will confirm appropriate f/u care. Patient stable in the ED without complaints.    Patient seen by OB who recommended U/S. Patient unable to stay for u/s today but will be able to upon return for repeat BHCG. HCG with mild decrease today and patient without pain.  Patient aware to return in 3 days for f/u HCG and US.

## 2021-10-08 NOTE — ED PROVIDER NOTE - NS ED ROS FT
Review of Systems:  · Constitutional: no chills, no fever, no night sweats, no weight loss  · Nose: no epitaxis  · Mouth/Throat: no difficulty in swallowing, trachea midline, uvula midline  · Respiratory: no cough, no exertional dyspnea, no hemoptysis, no orthopnea, no shortness of breath  · Gastrointestinal: no abdominal pain, no diarrhea, no melena, no nausea, no vomiting  · Genitourinary: no difficulty urinating, no dysuria, no hematuria  · MUSCULOSKELETAL: FROM of all extremities  · Skin: no abrasion; no bruising; no laceration  · Neurological: no change in level of consciousness, no headache, no seizures  · Psychiatric: no anxiety, no depression  · Endocrine: no excessive urination  · Heme/Lymph: no anemia, no easy bleeding  · Allergic/Immunologic: IMMUNIZATIONS UTD  · ROS STATEMENT: all other ROS negative except as per HPI

## 2021-10-08 NOTE — CONSULT NOTE ADULT - ASSESSMENT
- TVUS  - bHCG  - surgery recommended, patient declines    Patricia Azul PGY2 35y/o  @8w0d (by LMP 8/10/2021) w/ left ectopic pregnancy s/p 2nd dose methotrexate (10/5) presenting to the ED for day 4 bHCG. Patient currently in stable condition, VSS, abdominal exam notable for left lower quadrant pain, soft, +BS, no rebound or guarding.     bHCG trend: 4779 () ->MTX () -> 4701 (10/1) -> 5147 (10/5) ->MTX (10/5)    - recommended TVUS, NPO, Type and screen x2, and definitive surgical management today    - patient refusing surgery and left AMA stating that she has to  her kids  - patient reports that she will come back to ER on Monday, arrange for childcare, and proceed with surgery at that time if necessary   - Ectopic precautions reviewed, pt advised to return to the ED if she experiences intense abdominal pain, vomiting inability to tolerate PO.   - Strict return precautions reviewed including saturating pads (more than one an hour for two hours), passing large clots, or any signs or symptoms of anemia     d/w Dr. Salomon Azul PGY2 35y/o  @8w0d (by LMP 8/10/2021) w/ left ectopic pregnancy s/p 2nd dose methotrexate (10/5) presenting to the ED for day 4 bHCG. Patient currently in stable condition, VSS, abdominal exam notable for left lower quadrant pain, soft, +BS, no rebound or guarding.     bHCG trend: 4779 () ->MTX () -> 4701 (10/1) -> 5147 (10/5) ->MTX (10/5) -> 4529 (10/8)    - recommended TVUS, NPO, Type and screen x2, and definitive surgical management today    - patient refusing surgery and left AMA stating that she has to  her kids  - patient reports that she will come back to ER on Monday, arrange for childcare, and proceed with surgery at that time if necessary   - Ectopic precautions reviewed, pt advised to return to the ED if she experiences intense abdominal pain, vomiting inability to tolerate PO.   - Strict return precautions reviewed including saturating pads (more than one an hour for two hours), passing large clots, or any signs or symptoms of anemia     d/w Dr. Salomon Azul PGY2

## 2021-10-08 NOTE — CONSULT NOTE ADULT - SUBJECTIVE AND OBJECTIVE BOX
Gyn Consult Note  HAN WHEAT  36y  Female [id number]    HPI: 35y/o  @8w0d (by LMP 8/10/2021) presenting to the ED for follow up bHCG for known ectopic pregnancy. Patient is s/p methotrexate for L ectopic pregnancy, presenting for the day 7 bHCG level. Patient reports continued LLQ tenderness, similar to last visit. She reports vaginal bleeding has resolved. She denies N/V, dizziness, SOB, CP, urinary symptoms, changes in bowel habits.    bHCG trend: 4779 () ->MTX () -> 4701 (10/1) -> 5147 (10/5) ->MTX (10/5)    Name of GYN Physician: Dr. Hayden    OBHx:    -  x3 (, , )  - L ectopic pregnancy s/p MTX (~)  GYNHx: Denies fibroids, cysts, endometriosis, STI's, Abnormal pap smears   PMHx: Denies  PSHx: Denies  Meds: None  Allergies: NKDA  FHx: No pertinent family history in first degree relatives    Vital Signs Last 24 Hrs  T(C): 36.8 (08 Oct 2021 10:29), Max: 36.8 (08 Oct 2021 10:29)  T(F): 98.2 (08 Oct 2021 10:29), Max: 98.2 (08 Oct 2021 10:29)  HR: 97 (08 Oct 2021 10:29) (97 - 97)  BP: 113/72 (08 Oct 2021 10:29) (113/72 - 113/72)  BP(mean): --  RR: 19 (08 Oct 2021 10:29) (19 - 19)  SpO2: 99% (08 Oct 2021 10:29) (99% - 99%)    Physical Exam:   General: sitting comfortably in bed, NAD   CV: RRR  Lungs: CTAB  Abd: Soft, non-tender, non-distended.  Ext: non-tender b/l, no edema    Gyn Consult Note  HAN WHEAT  36y  Female [id number]    HPI: 37y/o  @8w0d (by LMP 8/10/2021) presenting to the ED for follow up bHCG for known ectopic pregnancy. Patient is s/p methotrexate for L ectopic pregnancy ( and 10/5), presenting for the day 4 post-treatment MTX dose #2  bHCG level. Patient reports continued LLQ tenderness 4/10 in severity upon palpation and 0/10 at rest, improved from last visit and one episode of sharp left sided pain last night at 10p 7-8/10 in severity. She reports vaginal bleeding has resolved. She denies N/V, dizziness, SOB, CP, urinary symptoms, changes in bowel habits.    bHCG trend: 4779 () ->MTX () -> 4701 (10/1) -> 5147 (10/5) ->MTX (10/5)    Name of GYN Physician: Dr. Hayden    OBHx:    -  x3 (, , )  - L ectopic pregnancy s/p MTX (~)  GYNHx: Denies fibroids, cysts, endometriosis, STI's, Abnormal pap smears   PMHx: Denies  PSHx: Denies  Meds: None  Allergies: NKDA  FHx: No pertinent family history in first degree relatives    Vital Signs Last 24 Hrs  T(C): 36.8 (08 Oct 2021 10:29), Max: 36.8 (08 Oct 2021 10:29)  T(F): 98.2 (08 Oct 2021 10:29), Max: 98.2 (08 Oct 2021 10:29)  HR: 97 (08 Oct 2021 10:29) (97 - 97)  BP: 113/72 (08 Oct 2021 10:29) (113/72 - 113/72)  BP(mean): --  RR: 19 (08 Oct 2021 10:29) (19 - 19)  SpO2: 99% (08 Oct 2021 10:29) (99% - 99%)    Physical Exam:   General: sitting comfortably in bed, NAD   CV: RRR  Lungs: CTAB  Abd: Soft, non-tender, non-distended.  Ext: non-tender b/l, no edema    Gyn Consult Note  HAN WHEAT  36y  Female [id number]    HPI: 37y/o  @8w0d (by LMP 8/10/2021) presenting to the ED for follow up bHCG for known ectopic pregnancy. Patient is s/p methotrexate for L ectopic pregnancy ( and 10/5), presenting for the day 4 post-treatment MTX dose #2  bHCG level. Patient reports continued LLQ tenderness 4/10 in severity upon palpation and 0/10 at rest, improved from last visit and one episode of sharp left sided pain last night at 10p 7-8/10 in severity. She reports vaginal bleeding has resolved. She denies N/V, dizziness, SOB, CP, urinary symptoms, changes in bowel habits.    bHCG trend: 4779 () ->MTX () -> 4701 (10/1) -> 5147 (10/5) ->MTX (10/5)    Name of GYN Physician: Dr. Hayden    OBHx:    -  x3 (, , )  - L ectopic pregnancy s/p MTX (~)  GYNHx: Denies fibroids, cysts, endometriosis, STI's, Abnormal pap smears   PMHx: Denies  PSHx: Denies  Meds: None  Allergies: NKDA  FHx: No pertinent family history in first degree relatives    Vital Signs Last 24 Hrs  T(C): 36.8 (08 Oct 2021 10:29), Max: 36.8 (08 Oct 2021 10:29)  T(F): 98.2 (08 Oct 2021 10:29), Max: 98.2 (08 Oct 2021 10:29)  HR: 97 (08 Oct 2021 10:29) (97 - 97)  BP: 113/72 (08 Oct 2021 10:29) (113/72 - 113/72)  BP(mean): --  RR: 19 (08 Oct 2021 10:29) (19 - 19)  SpO2: 99% (08 Oct 2021 10:29) (99% - 99%)    Physical Exam:   General: sitting comfortably in bed, NAD   CV: RRR  Lungs: CTAB  Abd: Soft, non-tender, non-distended.  Ext: non-tender b/l, no edema                           12.3   4.43  )-----------( 302      ( 10-08 @ 11:29 )             35.5                12.0   6.47  )-----------( 276      ( 10-05 @ 01:17 )             35.3                12.2   7.96  )-----------( 261      (  @ 23:52 )             36.2              HCG Quantitative, Serum (10.08.21 @ 11:29)  Type + Screen (10.05.21 @ 03:32)    ABO Interpretation: B    Rh Interpretation: Positive    Antibody Screen: Negative      HCG Quantitative, Serum: 4529.0: HCG-Quantitative test interpretations: This test is intended only for the  detection & monitoring of pregnancy. For oncology studies order the tumor  marker test “HCG-TM” (hCG-Tumor Marker).  For pregnancy evaluation the reference values are as follows:  Negative:  <=4 mIU/mL  Indeterminate:  5 - 25 mIU/mL (suggest repeat testing in 72 hours)  Positive:  > 25 mIU/mL  Reference Values during Pregnancy:  Weeks after LMP* REFERENCE INTERVAL mIU/mL     3      6 - 71     4      10 - 750     5      220 - 7,100     6      160 - 32,000     7      3,700 - 164,000     8      32,000 - 150,000     9      64,000 - 151,000     10      47,000 - 187,000     12      28,000 - 211,000     14      14,000 - 63,000     15      12,000 - 71,000     16 - 18  8,000 - 58,000  * LMP:  Last Menstrual Period  HCG results of false positive and false negative for pregnancy are rare,  but can occur with this, and other, hCG tests. Nuvia- and post-menopausal  females may have mildly elevated hCG concentrations usually less than 14  mIU/mL that are constant over time. mIU/mL      < from: US Transvaginal, OB (10.05.21 @ 02:02) >  IMPRESSION:  Revisualized left adnexal pregnancy containing a fetal pole demonstrating slow cardiac activity.    < end of copied text >

## 2021-10-08 NOTE — ED PROVIDER NOTE - OBJECTIVE STATEMENT
Patient returns today for second dose of methotrexate of a two dose regimen. Patient with known ectopic pregnancy. History which is taken from previous OB/GYN note from two days ago includes:    "a/p:35 yo P3 with known left ectopic s/p MTX single dose tx for Day 8 follow up.  Pt with no worsening sxs.  No signs of acute abdomen.  HCG with inappropriate response to MTX.  TVS - no signs of rupture prelim review.  AT this time, pt with failed medical mngt of MTX.  D/w pt concern about no drop in HCG level and recommend surgical definitive mngt  in context of no change in HCG and still with LLQ pain even though no u/s findings nor exam c/w rupture.  Pt again adamant that surgery is a "last resort" and declined surgery again.  Pt desires another trial for MTX and will consider a 2-dose regimen this time.  Pt with no absolute contraindication to medical mngt even though would this team prefers to provide surgical mngt.  Pt still aware of possible rupture that can occur despite tx and would need emergent surgery.  Pt also aware of possible need for surgery if MTX again fails.  -Consents signed again  - mg IM today in ED  -Pt to return to ED in the AM on 10/8 for Day 4 HCG and for possible additional MTX dose of 100 mg IM and then return on 10/11 for Day 7 HCG  -ED precautions again reviewed - severe pain, bleeding, lightheadedness, dizziness"    Patient without bleeding and without pain. Patient without complaints today.

## 2021-10-08 NOTE — ED PROVIDER NOTE - PATIENT PORTAL LINK FT
You can access the FollowMyHealth Patient Portal offered by [facility] by registering at the following website: [email or web address]. By joining [email or web address]’s FollowMyHealth portal, you will also be able to view your health information using other applications (apps) compatible with our system.

## 2021-10-08 NOTE — ED PROVIDER NOTE - PHYSICAL EXAMINATION
· Physical Examination: PHYSICAL EXAM:   · CONSTITUTIONAL:  Appearance: well appearing.  Development: well developed.  Distress: no apparent  · Manner: appropriate for situation.  Mentation: awake, alert, oriented to person, place, time/situation  · Mood: appropriate.  Nourishment: well  · Head Shape: normal cephalic, ATRAUMATIC  · EYES: bilateral normal, no discharge, redness or evidence of any abnormality  · Nose: clear Mouth: normal mucosa  · Throat: uvula midline, no vesicles, no redness, and no oropharyngeal exudate.  · CARDIAC:  CARDIAC RHYTHM: regular  CARDIAC RATE: normal  CARDIAC PEDAL EDEMA: absent  CARDIAC JVD: non-distended bilaterally  · CARDIAC PULSES: normal bilaterally  · RESPIRATORY:  Respiratory Distress: no  Breath Sounds: normal  · Chest Exam: normal, non-tender  · Abdominal Exam: soft, nondistended, nontender  · MUSCULOSKELETAL: Spine appears normal, range of motion is not limited, no muscle or joint tenderness  · NEUROLOGICAL: Alert and oriented, no focal deficits, no motor or sensory deficits.  · SKIN: Skin normal color for race, warm, dry and intact. No evidence of rash.  · PSYCHIATRIC: Alert and oriented to person, place, time/situation. normal mood and affect. no apparent risk to self or others.  · HEME LYMPH: No adenopathy or splenomegaly. No cervical or inguinal lymphadenopathy.

## 2021-10-12 ENCOUNTER — EMERGENCY (EMERGENCY)
Facility: HOSPITAL | Age: 36
LOS: 1 days | End: 2021-10-12
Payer: MEDICARE

## 2021-10-12 ENCOUNTER — EMERGENCY (EMERGENCY)
Facility: HOSPITAL | Age: 36
LOS: 1 days | Discharge: HOME | End: 2021-10-12
Attending: EMERGENCY MEDICINE
Payer: MEDICARE

## 2021-10-12 VITALS
SYSTOLIC BLOOD PRESSURE: 105 MMHG | RESPIRATION RATE: 17 BRPM | DIASTOLIC BLOOD PRESSURE: 72 MMHG | WEIGHT: 171.96 LBS | HEIGHT: 65 IN | OXYGEN SATURATION: 100 % | HEART RATE: 88 BPM | TEMPERATURE: 98 F

## 2021-10-12 VITALS
SYSTOLIC BLOOD PRESSURE: 103 MMHG | HEART RATE: 76 BPM | WEIGHT: 169.98 LBS | DIASTOLIC BLOOD PRESSURE: 69 MMHG | TEMPERATURE: 98 F | RESPIRATION RATE: 15 BRPM | HEIGHT: 65 IN | OXYGEN SATURATION: 99 %

## 2021-10-12 LAB — HCG SERPL-ACNC: 3159 MIU/ML — HIGH

## 2021-10-12 PROCEDURE — 99284 EMERGENCY DEPT VISIT MOD MDM: CPT

## 2021-10-12 PROCEDURE — L9991: CPT

## 2021-10-12 PROCEDURE — 84702 CHORIONIC GONADOTROPIN TEST: CPT

## 2021-10-12 NOTE — ED PROVIDER NOTE - PATIENT PORTAL LINK FT
You can access the FollowMyHealth Patient Portal offered by [facility] by registering at the following website: [email or web address]. By joining [email or web address]’s FollowMyHealth portal, you will also be able to view your health information using other applications (apps) compatible with our system.

## 2021-10-12 NOTE — CONSULT NOTE ADULT - ASSESSMENT
35y/o  @9w0d GA (by LMP 8/10/2021) presenting to the ED for follow up bHCG for known ectopic pregnancy. Patient is s/p methotrexate for L ectopic pregnancy ( and 10/5), presenting for the day 7 post-treatment MTX dose #2  bHCG level. 35y/o  @9w0d GA (by LMP 8/10/2021) presenting to the ED for follow up bHCG for known ectopic pregnancy. Patient is s/p methotrexate for L ectopic pregnancy ( and 10/5), presenting for the day 7 post-treatment MTX dose #2  bHCG level (pt unable to come to ED yesterday for day 7--here today for Day 8). Pt w/ improved pain & bleeding.    -pt w/ improved pain & no vaginal bleeding at this time  -counseled pt extensively with ED provider Dr. Carranza regarding risk of ruptured ectopic pregnancy s/p MTX administration  -reviewed risks including internal bleeding & death if ectopic pregnancy with inappropriately downtrending bhcg is not removed surgically in an acute setting  -pt refuses to stay for surgery today if bhcg not appropriately downtrending--pt states she does not have access to childcare and would only go for surgery if it was scheduled as an outpatient procedure at the ambulatory surgery center  -will provide final recommendations once bhcg results    Chito Singh,PGY2  D/w Dr. Chilel   35y/o  @9w0d GA (by LMP 8/10/2021) presenting to the ED for follow up bHCG for known ectopic pregnancy. Patient is s/p methotrexate for L ectopic pregnancy ( and 10/5), presenting for the day 7 post-treatment MTX dose #2  bHCG level (pt unable to come to ED yesterday for day 7--here today for Day 8). Pt w/ improved pain & bleeding.    -pt w/ improved pain & no vaginal bleeding at this time  -counseled pt extensively with ED provider Dr. Carranza regarding risk of ruptured ectopic pregnancy s/p MTX administration  -reviewed risks including internal bleeding & death if ectopic pregnancy with inappropriately downtrending bhcg is not removed surgically in an acute setting  -pt refuses to stay for surgery today if bhcg not appropriately downtrending--pt states she does not have access to childcare and would only go for surgery if it was scheduled as an outpatient procedure at the ambulatory surgery center  -will provide final recommendations once bhcg results    Cihto Singh,PGY2  D/w Dr. Chilel  ---------------------------------------------------------  ADDENDUM:    bHCG trend: 4779 () ->MTX () -> 4701 (10/1) -> 5147 (10/5) ->MTX (10/5)->4529 (10/8) left AMA at this time->3159 (10/12)    Pt's bhcg resulted, 3159 (>15% decrease from last bhcg 4529). Pt not in her room upon my return to ED to review results. ED provider strates pt left after finding out the result of her labs. Called pt's cell phone to review results with her.     As bhcg dropped appropriately from last level (>15%), recommend pt obtain weekly hcg levels at clinic to trend bhcg. Pt agrees with this plan. Pt understands she is to return to the ED with any new abdominal pain, vaginal bleeding, lightheadedness, shortness of breath or syncope.     Pt to have beta hcg levels trended weekly until negative. Pt to follow-up with Dr. Hayden (private).    Chito Singh,PGY2  D/w Dr. Chilel      37y/o  @9w0d GA (by LMP 8/10/2021) presenting to the ED for follow up bHCG for known ectopic pregnancy. Patient is s/p methotrexate for L ectopic pregnancy ( and 10/5), presenting for the day 7 post-treatment MTX dose #2  bHCG level (pt unable to come to ED yesterday for day 7--here today for Day 8). Pt w/ improved pain & bleeding.    -pt w/ improved pain & no vaginal bleeding at this time  -counseled pt extensively with ED provider Dr. Carranza regarding risk of ruptured ectopic pregnancy s/p MTX administration  -reviewed risks including internal bleeding & death if ectopic pregnancy with inappropriately downtrending bhcg is not removed surgically in an acute setting  -pt refuses to stay for surgery today if bhcg not appropriately downtrending--pt states she does not have access to childcare and would only go for surgery if it was scheduled as an outpatient procedure at the ambulatory surgery center  -will provide final recommendations once bhcg results    Chito Singh,PGY2  D/w Dr. Chilel  ---------------------------------------------------------  ADDENDUM:    bHCG trend: 4779 () ->MTX () -> 4701 (10/1) -> 5147 (10/5) ->MTX (10/5)->4529 (10/8) left AMA at this time->3159 (10/12)    Pt's bhcg resulted, 3159 (>15% decrease from last bhcg 4529). Pt not in her room upon my return to ED to review results. ED provider strates pt left after finding out the result of her labs. Called pt's cell phone to review results with her.     As bhcg dropped appropriately from last level (>15%), recommend pt obtain weekly hcg levels at clinic to trend bhcg. Pt agrees with this plan. Pt understands she is to return to the ED with any new abdominal pain, vaginal bleeding, lightheadedness, shortness of breath or syncope.     Pt to have beta hcg levels trended weekly until negative. Pt to follow-up with Dr. Hayden (private). Called [address] [place] [address] to put pt on schedule for blood draw for Monday morning (10/18).     Chito Singh,PGY2  D/w Dr. Chilel

## 2021-10-12 NOTE — ED PROVIDER NOTE - CARE PLAN
Assessment and plan of treatment:	37yo F w known L ectopic s/p MTX x 2 presenting for BHCG check without associated sx.  -BHCG  -OB/Gyn   Principal Discharge DX:	Ectopic pregnancy  Assessment and plan of treatment:	35yo F w known L ectopic s/p MTX x 2 presenting for BHCG check without associated sx.  -CG  -OB/Gyn   1

## 2021-10-12 NOTE — ED ADULT NURSE NOTE - OBJECTIVE STATEMENT
Pt is a 36 Y A&O x3 F presenting to ED for repeat serum hcg level. Pt here in ED on 10/8 for 2nd methotrexate dose and serum hcg blood draw. Pt previously declined surgery for ectopic as recommended by OBGYN. Pt is a 36 Y A&O x3 F presenting to ED for repeat serum hcg level. Pt here in ED on 10/8 for 2nd methotrexate dose and serum hcg blood draw. Pt diagnosed with L sided ectopic pregnancy on 9/28/21. Pt previously declined surgery for ectopic as recommended by OBGYN. Pt arrives to ED breathing unlabored on RA. Pt speaking in complete sentences. Skin is warm and dry. No diaphoresis noted. No edema noted to LE b/l. Pt ambulatory with steady gait. Safety and comfort maintained.

## 2021-10-12 NOTE — CHART NOTE - NSCHARTNOTEFT_GEN_A_CORE
R4 Chart Note    Patient presented to ED overnight, left prior to evaluation or lab draw. Attempted to call patient, no answer. Will continue to call patient and will send certify letter if unable to reach.    JUANI Kellogg PGY4

## 2021-10-12 NOTE — CONSULT NOTE ADULT - SUBJECTIVE AND OBJECTIVE BOX
HAN WHEAT  36y  Female [id number]    HPI: 35y/o  @9w0d GA (by LMP 8/10/2021) presenting to the ED for follow up bHCG for known ectopic pregnancy. Patient is s/p methotrexate for L ectopic pregnancy ( and 10/5), presenting for the day 7 post-treatment MTX dose #2  bHCG level. Pt did not present yesterday on day 7 after stating she would come to the ED over the phone. Pt states her pain is much improved from last visit and she has not had any vaginal bleeding since. Pt is vehemently opposed to staying for surgery today if bhcg levels do not reflect a proper downtrend. Pt states if surgery is required, she wants an outpatient ambulatory surgery scheduled for the procedure.       bHCG trend: 4779 () ->MTX () -> 4701 (10/1) -> 5147 (10/5) ->MTX (10/5)->4529 (10/8) left AMA at this time    Name of GYN Physician: Dr. Hayden    OBHx:    -  x3 (, , )  - L ectopic pregnancy s/p MTX (~)  GYNHx: Denies fibroids, cysts, endometriosis, STI's, Abnormal pap smears   PMHx: Denies  PSHx: Denies  Meds: None  Allergies: NKDA  FHx: No pertinent family history in first degree relatives      Vital Signs Last 24 Hrs  T(C): 36.9 (12 Oct 2021 12:35), Max: 36.9 (12 Oct 2021 00:16)  T(F): 98.4 (12 Oct 2021 12:35), Max: 98.4 (12 Oct 2021 00:16)  HR: 76 (12 Oct 2021 12:35) (76 - 88)  BP: 103/69 (12 Oct 2021 12:35) (103/69 - 105/72)  BP(mean): --  RR: 15 (12 Oct 2021 12:35) (15 - 17)  SpO2: 99% (12 Oct 2021 12:35) (99% - 100%)    Physical Exam:   General: sitting comfortably in bed, NAD   Pulm: breathing comfortably on RA  Abdomen: soft, nondistended, nontender bilaterally  :         HAN WHEAT  36y  Female [id number]    HPI: 37y/o  @9w0d GA (by LMP 8/10/2021) presenting to the ED for follow up bHCG for known ectopic pregnancy. Patient is s/p methotrexate for L ectopic pregnancy ( and 10/5), presenting for the day 7 post-treatment MTX dose #2  bHCG level. Pt did not present yesterday on day 7 after stating she would come to the ED over the phone. Pt states her pain is much improved from last visit and she has not had any vaginal bleeding since. Pt is vehemently opposed to staying for surgery today if bhcg levels do not reflect a proper downtrend. Pt states if surgery is required, she wants an outpatient ambulatory surgery scheduled for the procedure.       bHCG trend: 4779 () ->MTX () -> 4701 (10/1) -> 5147 (10/5) ->MTX (10/5)->4529 (10/8) left AMA at this time->    Name of GYN Physician: Dr. Hayden    OBHx:    -  x3 (, , )  - L ectopic pregnancy s/p MTX (~)  GYNHx: Denies fibroids, cysts, endometriosis, STI's, Abnormal pap smears   PMHx: Denies  PSHx: Denies  Meds: None  Allergies: NKDA  FHx: No pertinent family history in first degree relatives      Vital Signs Last 24 Hrs  T(C): 36.9 (12 Oct 2021 12:35), Max: 36.9 (12 Oct 2021 00:16)  T(F): 98.4 (12 Oct 2021 12:35), Max: 98.4 (12 Oct 2021 00:16)  HR: 76 (12 Oct 2021 12:35) (76 - 88)  BP: 103/69 (12 Oct 2021 12:35) (103/69 - 105/72)  BP(mean): --  RR: 15 (12 Oct 2021 12:35) (15 - 17)  SpO2: 99% (12 Oct 2021 12:35) (99% - 100%)    Physical Exam:   General: sitting comfortably in bed, NAD   Pulm: breathing comfortably on RA  Abdomen: soft, nondistended, nontender bilaterally

## 2021-10-12 NOTE — ED PROVIDER NOTE - CLINICAL SUMMARY MEDICAL DECISION MAKING FREE TEXT BOX
loyda - pt is sp l sided ectopic sp methotrexate x2 with bhcg levels -which are not decreasing appropriately - pain has decreased no vaginal bleeding- pt was supposed to have blood drawn 2 days ago but didn't have it done called by ob x3 and here for repeat blood levels

## 2021-10-12 NOTE — ED PROVIDER NOTE - PROGRESS NOTE DETAILS
BHCG 3159. Evaluated by OB/Gyn, pt prefers to schedule surgery at Ambulatory surgery center. Pt requesting discharge. Risks including internal bleeding & death if ectopic pregnancy with inappropriately downtrending bhcg is not removed surgically in an acute setting by OB/Gyn, reiterated by ED. Pt remains well appearing. Pt discharged. OB/Gyn notified. -Zainab Fay PA-C pt is adamant that they will not stay regardless of level - pt understands risk of methotrexate failure and ectopic pregnancy w rupture - but unwilling to stay regardless of level- ob at bedside reinforcing the same - pt competent to make decision

## 2021-10-12 NOTE — ED PROVIDER NOTE - PLAN OF CARE
35yo F w known L ectopic s/p MTX x 2 presenting for BHCG check without associated sx.  -BHCG  -OB/Gyn

## 2021-10-12 NOTE — ED PROVIDER NOTE - NSFOLLOWUPINSTRUCTIONS_ED_ALL_ED_FT
Please follow up with your OB/Gyn to schedule your outpatient surgery as you prefer.  Again, we recommend emergent surgery in the setting of a BHCG hormone not downtrending appropriately. Risks of discharge include but are not limited to internal bleeding & death.  Please return to the ER for worsened/severe abdominal pain, vaginal bleeding, lightheadedness, vomiting, chest pain or any concern.

## 2021-10-12 NOTE — ED PROVIDER NOTE - OBJECTIVE STATEMENT
35yo F denies [facility]  LMP 8/10/20 presents for evaluation of [facility] [place] s/p L ectopic treated w MTX. At last visit, recommended emergent surgery but pt declined.  CG trend: 4779 () ->MTX () -> 4701 (10/1) -> 5147 (10/5) ->MTX (10/5) -> 4529 (10/8)  Pt feeling well. Denies abd pain, vaginal bleeding (since initial MTX dose), chest pain, sob, palpitations, lightheadedness, fever, chills, nausea/vomiting.

## 2021-10-18 ENCOUNTER — NON-APPOINTMENT (OUTPATIENT)
Age: 36
End: 2021-10-18

## 2021-10-18 RX ORDER — CLINDAMYCIN HYDROCHLORIDE 300 MG/1
300 CAPSULE ORAL
Qty: 14 | Refills: 0 | Status: ACTIVE | COMMUNITY
Start: 2021-09-16 | End: 1900-01-01

## 2021-10-25 LAB
HCG SERPL-MCNC: 1390 MIU/ML
HCG SERPL-MCNC: 920 MIU/ML

## 2021-11-02 NOTE — CHART NOTE - NSCHARTNOTEFT_GEN_A_CORE
Attempted to call patient via phone number in pts ED chart to remind her to come in to the ED tomorrow for repeat bhcg (day 7 s/p methotrexate). Phone number not in service.     Chito Singh,PGY2
[facility] Pt was supposed to f/u on Nov. 1 for B-hCG but did not go to clinic. Pt stated that she did not go to clinic on Nov. 1 for blood draw because she needed a referral/script. Informed pt that I will put in an order. Pt stated that she would go to clinic tomorrow (Nov. 3) for blood draw. Will f/u with pt on Nov. 3
Called pt at [phone] to see if pt coming to the ED today for repeat bhcg (day 7 s/p mtx). Pt confirmed that she will be here within the next 1-2hrs for repeat bhcg. Night team aware.    Chito Singh,PGY2

## 2021-11-08 ENCOUNTER — NON-APPOINTMENT (OUTPATIENT)
Age: 36
End: 2021-11-08

## 2021-11-08 ENCOUNTER — LABORATORY RESULT (OUTPATIENT)
Age: 36
End: 2021-11-08

## 2021-11-18 LAB — HCG SERPL-MCNC: 3 MIU/ML

## 2021-11-18 NOTE — CHART NOTE - NSCHARTNOTESELECT_GEN_ALL_CORE
Gyn Resident Call
R1 Communication
R1 Communication Note
Gyn Resident Call
Phone Call
Phone Contact
Phone Contact Attempt
R1 Beta Communication

## 2021-11-18 NOTE — CHART NOTE - NSCHARTNOTEFT_GEN_A_CORE
Attempted to call patient to see if pt went to clinic today for repeat bhcg levels. No answer at provided phone number. Will call again tomorrow.    Chito Singh,PGY2
Called pt to f/u on whether she was planning on going to clinic today for repeat B-hCG. No response. Will monitor chart for results and try calling again on 11/2    MASSIEL Marks, PGY-1
Called pt to make her aware of lab draw scheduled for Monday 10/18 at [address] [place] [address] for repeat bhcg levels. Pt understands she is to present to the ED if she has increased levels vaginal bleeding or abdominal cramping.     Chito Singh, PGY2
Called pt to remind her to have bHCG drawn today at the clinic lab. No answer. Unable to leave VM.     Beth Mason, PGY1
Called pt to review this week's bhcg levels (dropped from 1390 on 10/19 to 920 on 10/25). Pt w/o any complaints at this time. Pt will follow up in clinic on Monday 11/1 for repeat bhcg levels. Will continue to follow pt until bhcg negative.      Chito Singh,PGY2
Called pt. She was due for bHCG level on 11/15. Pt states she had a HA on 11/15 and today (11/16) so she didn't go in for the blood draw. Pt states she called the clinic and they said she can use the same order to get blood drawn tomorrow. Pt plans to get bHCG drawn tomorrow. Will f/u results.     Beth Mason, PGY1
[facility] She will not need further [facility] blood draws. Pt expressed understanding. She was advised to follow up with her OB/GYN for regular care.     Beth Mason, PGY1
Spoke to patient regarding results of B-hCG on 11/8. Pt aware that value has come down significantly to 181 but will need to f/u next week for repeat. Order for repeat beta placed. Pt will f/u at same lab next week.     MASSIEL Marks, PGY-1

## 2021-12-02 DIAGNOSIS — B96.89 ACUTE VAGINITIS: ICD-10-CM

## 2021-12-02 DIAGNOSIS — N76.0 ACUTE VAGINITIS: ICD-10-CM

## 2021-12-02 RX ORDER — METRONIDAZOLE 7.5 MG/G
0.75 GEL VAGINAL
Qty: 1 | Refills: 0 | Status: ACTIVE | COMMUNITY
Start: 2018-04-30 | End: 1900-01-01

## 2021-12-02 RX ORDER — MEDROXYPROGESTERONE ACETATE 150 MG/ML
150 INJECTION, SUSPENSION INTRAMUSCULAR
Qty: 1 | Refills: 3 | Status: ACTIVE | COMMUNITY
Start: 2021-04-28 | End: 1900-01-01

## 2021-12-07 ENCOUNTER — APPOINTMENT (OUTPATIENT)
Dept: OBGYN | Facility: CLINIC | Age: 36
End: 2021-12-07

## 2022-01-10 ENCOUNTER — EMERGENCY (EMERGENCY)
Facility: HOSPITAL | Age: 37
LOS: 1 days | Discharge: HOME | End: 2022-01-10
Attending: STUDENT IN AN ORGANIZED HEALTH CARE EDUCATION/TRAINING PROGRAM
Payer: MEDICARE

## 2022-01-10 VITALS
DIASTOLIC BLOOD PRESSURE: 73 MMHG | TEMPERATURE: 99 F | RESPIRATION RATE: 16 BRPM | SYSTOLIC BLOOD PRESSURE: 103 MMHG | OXYGEN SATURATION: 99 % | HEART RATE: 81 BPM

## 2022-01-10 VITALS
SYSTOLIC BLOOD PRESSURE: 114 MMHG | OXYGEN SATURATION: 95 % | DIASTOLIC BLOOD PRESSURE: 67 MMHG | HEIGHT: 65 IN | TEMPERATURE: 98 F | HEART RATE: 95 BPM | WEIGHT: 175.05 LBS | RESPIRATION RATE: 18 BRPM

## 2022-01-10 LAB
ALBUMIN SERPL ELPH-MCNC: 4.2 G/DL — SIGNIFICANT CHANGE UP (ref 3.3–5)
ALP SERPL-CCNC: 102 U/L — SIGNIFICANT CHANGE UP (ref 40–120)
ALT FLD-CCNC: 11 U/L — SIGNIFICANT CHANGE UP (ref 10–45)
ANION GAP SERPL CALC-SCNC: 12 MMOL/L — SIGNIFICANT CHANGE UP (ref 5–17)
APPEARANCE UR: ABNORMAL
APTT BLD: 30 SEC — SIGNIFICANT CHANGE UP (ref 27.5–35.5)
AST SERPL-CCNC: 11 U/L — SIGNIFICANT CHANGE UP (ref 10–40)
BACTERIA # UR AUTO: ABNORMAL
BASOPHILS # BLD AUTO: 0.04 K/UL — SIGNIFICANT CHANGE UP (ref 0–0.2)
BASOPHILS NFR BLD AUTO: 0.5 % — SIGNIFICANT CHANGE UP (ref 0–2)
BILIRUB SERPL-MCNC: 0.2 MG/DL — SIGNIFICANT CHANGE UP (ref 0.2–1.2)
BILIRUB UR-MCNC: NEGATIVE — SIGNIFICANT CHANGE UP
BUN SERPL-MCNC: 12 MG/DL — SIGNIFICANT CHANGE UP (ref 7–23)
CALCIUM SERPL-MCNC: 9.3 MG/DL — SIGNIFICANT CHANGE UP (ref 8.4–10.5)
CHLORIDE SERPL-SCNC: 105 MMOL/L — SIGNIFICANT CHANGE UP (ref 96–108)
CO2 SERPL-SCNC: 23 MMOL/L — SIGNIFICANT CHANGE UP (ref 22–31)
COLOR SPEC: YELLOW — SIGNIFICANT CHANGE UP
CREAT SERPL-MCNC: 0.85 MG/DL — SIGNIFICANT CHANGE UP (ref 0.5–1.3)
DIFF PNL FLD: ABNORMAL
EOSINOPHIL # BLD AUTO: 0.05 K/UL — SIGNIFICANT CHANGE UP (ref 0–0.5)
EOSINOPHIL NFR BLD AUTO: 0.6 % — SIGNIFICANT CHANGE UP (ref 0–6)
EPI CELLS # UR: 1 /HPF — SIGNIFICANT CHANGE UP
GLUCOSE SERPL-MCNC: 84 MG/DL — SIGNIFICANT CHANGE UP (ref 70–99)
GLUCOSE UR QL: NEGATIVE — SIGNIFICANT CHANGE UP
HCG SERPL-ACNC: <2 MIU/ML — SIGNIFICANT CHANGE UP
HCT VFR BLD CALC: 37 % — SIGNIFICANT CHANGE UP (ref 34.5–45)
HGB BLD-MCNC: 12.2 G/DL — SIGNIFICANT CHANGE UP (ref 11.5–15.5)
HYALINE CASTS # UR AUTO: 0 /LPF — SIGNIFICANT CHANGE UP (ref 0–2)
IMM GRANULOCYTES NFR BLD AUTO: 0.3 % — SIGNIFICANT CHANGE UP (ref 0–1.5)
INR BLD: 1.09 RATIO — SIGNIFICANT CHANGE UP (ref 0.88–1.16)
KETONES UR-MCNC: NEGATIVE — SIGNIFICANT CHANGE UP
LEUKOCYTE ESTERASE UR-ACNC: ABNORMAL
LIDOCAIN IGE QN: 32 U/L — SIGNIFICANT CHANGE UP (ref 7–60)
LYMPHOCYTES # BLD AUTO: 2.38 K/UL — SIGNIFICANT CHANGE UP (ref 1–3.3)
LYMPHOCYTES # BLD AUTO: 30.5 % — SIGNIFICANT CHANGE UP (ref 13–44)
MCHC RBC-ENTMCNC: 31.4 PG — SIGNIFICANT CHANGE UP (ref 27–34)
MCHC RBC-ENTMCNC: 33 GM/DL — SIGNIFICANT CHANGE UP (ref 32–36)
MCV RBC AUTO: 95.1 FL — SIGNIFICANT CHANGE UP (ref 80–100)
MONOCYTES # BLD AUTO: 0.8 K/UL — SIGNIFICANT CHANGE UP (ref 0–0.9)
MONOCYTES NFR BLD AUTO: 10.3 % — SIGNIFICANT CHANGE UP (ref 2–14)
NEUTROPHILS # BLD AUTO: 4.51 K/UL — SIGNIFICANT CHANGE UP (ref 1.8–7.4)
NEUTROPHILS NFR BLD AUTO: 57.8 % — SIGNIFICANT CHANGE UP (ref 43–77)
NITRITE UR-MCNC: POSITIVE
NRBC # BLD: 0 /100 WBCS — SIGNIFICANT CHANGE UP (ref 0–0)
PH UR: 6 — SIGNIFICANT CHANGE UP (ref 5–8)
PLATELET # BLD AUTO: 285 K/UL — SIGNIFICANT CHANGE UP (ref 150–400)
POTASSIUM SERPL-MCNC: 3.4 MMOL/L — LOW (ref 3.5–5.3)
POTASSIUM SERPL-SCNC: 3.4 MMOL/L — LOW (ref 3.5–5.3)
PROT SERPL-MCNC: 6.7 G/DL — SIGNIFICANT CHANGE UP (ref 6–8.3)
PROT UR-MCNC: ABNORMAL
PROTHROM AB SERPL-ACNC: 13 SEC — SIGNIFICANT CHANGE UP (ref 10.6–13.6)
RBC # BLD: 3.89 M/UL — SIGNIFICANT CHANGE UP (ref 3.8–5.2)
RBC # FLD: 12.6 % — SIGNIFICANT CHANGE UP (ref 10.3–14.5)
RBC CASTS # UR COMP ASSIST: 5 /HPF — HIGH (ref 0–4)
SODIUM SERPL-SCNC: 140 MMOL/L — SIGNIFICANT CHANGE UP (ref 135–145)
SP GR SPEC: 1.02 — SIGNIFICANT CHANGE UP (ref 1.01–1.02)
UROBILINOGEN FLD QL: NEGATIVE — SIGNIFICANT CHANGE UP
WBC # BLD: 7.8 K/UL — SIGNIFICANT CHANGE UP (ref 3.8–10.5)
WBC # FLD AUTO: 7.8 K/UL — SIGNIFICANT CHANGE UP (ref 3.8–10.5)
WBC UR QL: 278 /HPF — HIGH (ref 0–5)

## 2022-01-10 PROCEDURE — 76830 TRANSVAGINAL US NON-OB: CPT | Mod: 26

## 2022-01-10 PROCEDURE — 81001 URINALYSIS AUTO W/SCOPE: CPT

## 2022-01-10 PROCEDURE — 85025 COMPLETE CBC W/AUTO DIFF WBC: CPT

## 2022-01-10 PROCEDURE — 93975 VASCULAR STUDY: CPT | Mod: 26

## 2022-01-10 PROCEDURE — 87186 SC STD MICRODIL/AGAR DIL: CPT

## 2022-01-10 PROCEDURE — 99284 EMERGENCY DEPT VISIT MOD MDM: CPT

## 2022-01-10 PROCEDURE — 80053 COMPREHEN METABOLIC PANEL: CPT

## 2022-01-10 PROCEDURE — 74177 CT ABD & PELVIS W/CONTRAST: CPT | Mod: 26,MA

## 2022-01-10 PROCEDURE — 96374 THER/PROPH/DIAG INJ IV PUSH: CPT | Mod: XU

## 2022-01-10 PROCEDURE — 84702 CHORIONIC GONADOTROPIN TEST: CPT

## 2022-01-10 PROCEDURE — 85730 THROMBOPLASTIN TIME PARTIAL: CPT

## 2022-01-10 PROCEDURE — 74177 CT ABD & PELVIS W/CONTRAST: CPT | Mod: MA

## 2022-01-10 PROCEDURE — 99282 EMERGENCY DEPT VISIT SF MDM: CPT | Mod: GC

## 2022-01-10 PROCEDURE — 93975 VASCULAR STUDY: CPT

## 2022-01-10 PROCEDURE — 76830 TRANSVAGINAL US NON-OB: CPT

## 2022-01-10 PROCEDURE — 83690 ASSAY OF LIPASE: CPT

## 2022-01-10 PROCEDURE — 99284 EMERGENCY DEPT VISIT MOD MDM: CPT | Mod: 25

## 2022-01-10 PROCEDURE — 85610 PROTHROMBIN TIME: CPT

## 2022-01-10 PROCEDURE — 87086 URINE CULTURE/COLONY COUNT: CPT

## 2022-01-10 RX ORDER — CEFTRIAXONE 500 MG/1
1000 INJECTION, POWDER, FOR SOLUTION INTRAMUSCULAR; INTRAVENOUS ONCE
Refills: 0 | Status: COMPLETED | OUTPATIENT
Start: 2022-01-10 | End: 2022-01-10

## 2022-01-10 RX ORDER — CEFPODOXIME PROXETIL 100 MG
1 TABLET ORAL
Qty: 20 | Refills: 0
Start: 2022-01-10 | End: 2022-01-19

## 2022-01-10 RX ADMIN — CEFTRIAXONE 100 MILLIGRAM(S): 500 INJECTION, POWDER, FOR SOLUTION INTRAMUSCULAR; INTRAVENOUS at 19:39

## 2022-01-10 NOTE — ED PROVIDER NOTE - ATTENDING CONTRIBUTION TO CARE
I, Vincent Duarte, performed a history and physical exam of the patient and discussed their management with the resident and/or advanced care provider. I reviewed the resident and/or advanced care provider's note and agree with the documented findings and plan of care. I was present and available for all procedures.    37 y/o female hx ectopic pregnancy left sided treated with methotrexate oct 2021 presenting for abd pain. patient states she has had waxing and waning pain for 2 days which seemed to be worse when laying down and laying on the left side, today pain became much more constant. associated with urinary frequency without dysuria or hematuria. due for menses in 5 days and denies any unusual vaginal discharge. she denies fever/chills/vomiting/diarrhea/bowel changes and rash. no prior abd surgeries.triage note indicates she was seen at another ER, she clarifies she was not seen by a physician, they kathryn blood but she had to leave to  her children. does not need anything for pain at this time    Well appearing and in NAD, head normal appearing atraumatic, trachea midline, no respiratory distress, lungs cta bilaterally, rrr no murmurs, soft llq ttp no rlq ttp, slight left cva ttp, no r cva ttp, no rt abdomen, no visible extremity deformities, Alert and oriented, non focal neuro exam, skin warm and dry, normal affect and mood    well appearing concerning for llq ttp for possible uti/pyelo unlikely nephrolithiasis, unlikely toa, torsion appx, will eval with tvus r/o torsion otherwise ctap r/o abscess with significant ttp in llq, otherwise screening labs r/o pregnancy urine

## 2022-01-10 NOTE — ED PROVIDER NOTE - PATIENT PORTAL LINK FT
You can access the FollowMyHealth Patient Portal offered by [facility] by registering at the following website: [email or web address]. By joining [facility]’s FollowMyHealth portal, you will also be able to view your health information using other applications (apps) compatible with our system.

## 2022-01-10 NOTE — ED PROVIDER NOTE - OBJECTIVE STATEMENT
35 y/o female hx ectopic pregnancy left sided treated with methotrexate oct 2021 presenting for abd pain. patient states she has had waxing and waning pain for 2 days which seemed to be worse when laying down and laying on the left side, today pain became much more constant. associated with urinary frequency without dysuria or hematuria. due for menses in 5 days and denies any unusual vaginal discharge. she denies fever/chills/vomiting/diarrhea/bowel changes and rash. no prior abd surgeries.triage note indicates she was seen at another ER, she clarifies she was not seen by a physician, they kathryn blood but she had to leave to  her children. does not need anything for pain at this time

## 2022-01-10 NOTE — ED ADULT NURSE REASSESSMENT NOTE - NS ED NURSE REASSESS COMMENT FT1
Report received from Delilah BROCK RN. Pt AAOx4, NAD, resp nonlabored, skin warm/dry, resting comfortably in bed with call bell at bedside. Pt denies headache, dizziness, chest pain, palpitations, SOB, n/v/d, urinary symptoms, fevers, chills, weakness at this time. Pt awaiting CT. Safety maintained.

## 2022-01-10 NOTE — ED PROVIDER NOTE - NSFOLLOWUPINSTRUCTIONS_ED_ALL_ED_FT
stay hydrated  Take Tylenol 650mg every 6 hrs as needed for pain  Take Cefpodoxime 2x per day for 10 days  Follow up with OB/GYN in 1-2 weeks regarding your ultrasound findings stay hydrated  Take Tylenol 650mg every 6 hrs as needed for pain  Take Cefpodoxime 2x per day for 10 days  Follow up with OB/GYN in 1-2 weeks regarding your ultrasound findings  Return to the ED for worsening symptoms, intractable pain or vomiting, fevers, or any new/concerning symptoms

## 2022-01-10 NOTE — ED ADULT NURSE NOTE - NS_ED_NURSE_TEACHING_TOPIC_ED_A_ED
pt aware she left prior to receiving OB recs. Insisting on leaving, PA and MD aware, ok to d/c pt.//OB/GYN

## 2022-01-10 NOTE — ED ADULT NURSE NOTE - OBJECTIVE STATEMENT
36y female presenting to ED complaining of abdominal pain x 3 days, dx with UTI at [facility] today and discharged with keflex was recommended to have CT scan but could not wait anymore, left [facility] and is now here instead

## 2022-01-10 NOTE — CONSULT NOTE ADULT - ATTENDING COMMENTS
ATTG note    Read and agree with above PGY2 note    35 yo P3 LMP 12/15 with h/o left ectopic x 2 s/p MTX as recent as Oct 2021 now presents with LLQ pain that worsened today.  No other associated sxs.  Sexual active on no contraception.  Has not followed up with GYN since last ectopic mngt.    VSS, afebrile  Exam- as above, no rebound, no guarding, no peritoneal signs    Labs  H/H-12/37  HCG-< 2  U/A-+nitrites  CT- nl  TVS-?? echogenic structure in left adnexa, c/w old ectopic,     35 yo P3 LMP 12/15 presents with LLQ pain.  VSS.  No signs of acute abdomen.  Neg HCG.  TVS- ??scar, remnant from prior ectopics.  +U/A.  -Source of this pain is not related to another ectopic as pt with neg HCG  -Pain may be associated with scar from h/o MTX for mngt of ectopic x 2   -No further GYN intervention   -REcommend tx of +u/a  -Pt to reestablish GYN care and counseled on importance of contraception use with increased risk for another ectopic - instructed to call [address] [place] [address] for f/u apt    BRIE Silva

## 2022-01-10 NOTE — CONSULT NOTE ADULT - ASSESSMENT
35 y/o  LMP 12/15 presenting with 3 day history of LLQ abdominal pain, worse today. bHCG is negative. TVUS showing an adnexal structure ?remnant of ectopic pregnancy. CTAP without etiology of abdominal pain, no acute findings. UA with UTI, patient treated with ceftriaxone. Pain improved spontaneously and patient left prior to being re-evaluated. Unclear etiology of pain.   - Called patient to discuss imaging findings  - Provided clinic phone number to establish GYN care  - Counseled on risk of recurrent ectopic pregnancy, recommended consistent condom use while establishing GYN follow up  - All questions answered and patient advised to return for any new or worsening symptoms    D/w Dr. Ricardo Restrepo  PGY-2

## 2022-01-10 NOTE — ED ADULT TRIAGE NOTE - PRO INTERPRETER NEED 2
Calling regarding: Karen from [facility] requested a call back re to update plan of care for the pt. Karen stated the pt is having some mild SOB that started last night and when she was seen, today her heart rate was 115-130, /76 with no nausea or vomiting. Pt is taking metoPROLOL succinate (TOPROL-XL) that will be increased to 75 MG daily and Eliquis 5 MG twice daily. Karen stated the pt had CBC, BMP, CMP and EKG. Karen was informed doctor is out today, and is okay to wait until doctor returns.             Caller: Karen from [facility]    Timeframe for callback: Tomorrow      Pharmacy information verified:  No     Phone number to be reached at: [phone]       English

## 2022-01-10 NOTE — ED ADULT NURSE REASSESSMENT NOTE - NS ED NURSE REASSESS COMMENT FT1
Pt states "I need to go home now, I will not wait any more. I have waited long enough", RN educated pt on plan of care/dispo, pt becomes verbally abusive, expressive and loud to RN, stating she wishes to leave at this time. KYE AmadoStephanie aware, to see pt for deescalation / reiteration of plan of care. Pt currently awaiting OB consult.

## 2022-01-10 NOTE — ED PROVIDER NOTE - CARE PROVIDER_API CALL
Radha Hayden)  OBSGYN  General  [address], [address]  [place] [address]  Phone: [phone]  Fax: [phone]  Follow Up Time:

## 2022-01-10 NOTE — CONSULT NOTE ADULT - SUBJECTIVE AND OBJECTIVE BOX
HAN WHEAT  36y  Female [id number]    HPI:  35 y/o  LMP 12/15 presenting with 3 day history of LLQ abdominal pain, worse today. Patient states that the pain had come on gradually while she was sitting in bed, the pain is sharp in nature. No associated sytmpoms. Today, the pain had gotten significantly worse, 7/10 in intensity and she presented to the ED. Patient states that the pain is very similar to her prior pain with ectopic pregnancy. She states that she is sexually active with one partner and she is not using contraception. She denies F/C, nausea, vomiting, diarrhea or constipation, vaginal bleeding, or abnormal vaginal discharge. Had last BM today.     Name of GYN Physician: None    OBHx:    -  x3 (, , )  - L ectopic pregnancy s/p MTX (~)  - L ectopic pregnancy s/p MTXx2 (2021 and 10/5/2021)   GYNHx: Denies fibroids, cysts, endometriosis, STI's, Abnormal pap smears   PMHx: Denies  PSHx: Denies  Meds: None  Allergies: NKDA  FHx: No pertinent family history in first degree relatives    Hospital Meds:   MEDICATIONS  (STANDING):    MEDICATIONS  (PRN):      Allergies    No Known Allergies    Intolerances        PAST MEDICAL & SURGICAL HISTORY:  Pregnancy, ectopic    No significant past surgical history        FAMILY HISTORY:    Vital Signs Last 24 Hrs  T(C): 37.2 (10 Vince 2022 18:14), Max: 37.2 (10 Vince 2022 18:14)  T(F): 98.9 (10 Vince 2022 18:14), Max: 98.9 (10 Vince 2022 18:14)  HR: 81 (10 Vince 2022 18:14) (81 - 95)  BP: 103/73 (10 Vince 2022 18:14) (103/73 - 114/67)  BP(mean): --  RR: 16 (10 Vince 2022 18:14) (16 - 18)  SpO2: 99% (10 Vince 2022 18:14) (95% - 99%)    Physical Exam:   General: Comfortably in bed, NAD   CV: RR S1S2 no m/r/g  Lungs: CTA b/l, good air flow b/l   Back: No CVA tenderness  Abd: Soft, mildly-tender in LLQ, non-distended.      :  No bleeding on pad.    External labia wnl.  Bimanual exam with no cervical motion tenderness, adnexa non palpable b/l. No adnexal tenderness.  Mild tenderness in posterior cul-de-sac.  Cervix closed    Speculum Exam: No active bleeding from os.  Physiologic discharge.    Ext: non-tender b/l, no edema     LABS:                       12.2   7.80  )-----------( 285      ( 10 Vince 2022 18:27 )             37.0     01-10    140  |  105  |  12  ----------------------------<  84  3.4<L>   |  23  |  0.85    Ca    9.3      10 Vince 2022 18:27    TPro  6.7  /  Alb  4.2  /  TBili  0.2  /  DBili  x   /  AST  11  /  ALT  11  /  AlkPhos  102  10    I&O's Detail    PT/INR - ( 10 Vince 2022 18:27 )   PT: 13.0 sec;   INR: 1.09 ratio         PTT - ( 10 Vince 2022 18:27 )  PTT:30.0 sec  Urinalysis Basic - ( 10 Vince 2022 18:27 )    Color: Yellow / Appearance: Slightly Turbid / S.025 / pH: x  Gluc: x / Ketone: Negative  / Bili: Negative / Urobili: Negative   Blood: x / Protein: 30 mg/dL / Nitrite: Positive   Leuk Esterase: Large / RBC: 5 /hpf /  /HPF   Sq Epi: x / Non Sq Epi: 1 /hpf / Bacteria: Many    bHCG: negative    RADIOLOGY & ADDITIONAL STUDIES:  < from: CT Abdomen and Pelvis w/ IV Cont (01.10.22 @ 19:22) >  FINDINGS:  LOWERCHEST: Within normal limits.    LIVER: Enlarged measuring 18.2 cm craniocaudally.  BILE DUCTS: Normal caliber.  GALLBLADDER: Within normal limits.  SPLEEN: Within normal limits.  PANCREAS: Within normal limits.  ADRENALS: Within normal limits.  KIDNEYS/URETERS: Within normal limits.    BLADDER: Bladder wall thickening versus underdistention.  REPRODUCTIVE ORGANS: Retroverted uterus. Involuting right corpus luteum.    BOWEL: No bowel obstruction. Appendix is normal.  PERITONEUM: No ascites.  VESSELS: Within normal limits.  RETROPERITONEUM/LYMPH NODES: No lymphadenopathy.  ABDOMINAL WALL: Within normal limits.  BONES: Within normal limits.    IMPRESSION:  No acute abnormality is noted.    --- End of Report ---    < end of copied text >  < from: US Doppler Pelvis (01.10.22 @ 19:08) >  FINDINGS:    Uterus: 8.9 cm x 5.8 cm x 7.1 cm. Within normal limits.  Endometrium: 8 mm. Within normal limits.    Right ovary: 3.0 cm x 1.4 cm x 2.5 cm. Within normal limits. Corpus   luteal cyst measures 0.9 x 1.3 x 1.3 cm. Normal arterial and venous   waveforms.  Left ovary: 2.2 cm x 1.5 cm x 1.6 cm. Normal arterial and venous   waveforms. There is a mixed echogenic 1.7 x 1.7 x 1.6 cm left adnexal   mass, in the region of the previous left adnexal ectopic pregnancy   without central vascularity.    Fluid: None.    IMPRESSION:  No evidence of ovarian torsion.  There is amixed echogenic 1.7 x 1.7 x 1.6 cm left adnexal lesion which   may represent the known ectopic pregnancy.    < end of copied text >

## 2022-01-10 NOTE — ED PROVIDER NOTE - SKIN, MLM
Skin normal color for race, warm, dry and intact. No evidence of rash to the left flank where patient reports pain

## 2022-01-10 NOTE — ED PROVIDER NOTE - PROGRESS NOTE DETAILS
ob at bedside. - Soha Brown PA-C patient very upset stating she needs to leave to get home to her kids states "clearly this is not important if ob/gyn isnt here yet." discussed there could be important diagnoses we would miss without their expertise on the matter is this is unusual this far out from ectopic. patient reports she will wait 20 minutes and then would like to leave, understands this will be constanza Brown PA-C patient very upset stating she needs to leave to get home to her kids states "clearly this is not important if ob/gyn isnt here yet." discussed there could be important diagnoses we would miss without their expertise on the matter is this is unusual this far out from ectopic. patient reports she will wait 20 minutes and then would like to leave, understands this will be ama.   The patient has decided to leave against medical advice.  The patient is AAOx3, not intoxicated, and displays normal decision making ability. We discussed all risks, benefits, and alternatives to the progression of treatment and the potential dangers of leaving including but not limited to permanent disability, injury. The patient was instructed that they are welcome to change their decision to leave against medical advice and return to the emergency department at any time and for any reason in order to allow us to render care.  - Soha Brown PA-C jeniffer attending- discussed with radiology resident regarding ultrasound reading of "ectopic pregnancy" not possible without +bHCG at this time, after review addendum was placed now with possible scar tissue in left adnexal region, discussed with obgyn will eval patient unlikely toa possible mass vs complex cystic structure but with neg hcg no ectopic. pending obgyn reccs discussed with patient aware of liver enlargement advised pmd follow up nonurgently. still pending ob recs. discussed with patient aware of liver enlargement advised pmd follow up nonurgently. still pending ob recs. per RN Jatinder, no  longer willing to wait for OB, will dc now with follow up information - Soha Brown PA-C

## 2022-01-10 NOTE — ED ADULT TRIAGE NOTE - CHIEF COMPLAINT QUOTE
abdominal pain x 3 days, dx with UTI at [facility] today and discharged with keflex  was recommended to have CT scan but could not wait anymore, left [facility] and is now here instead

## 2022-01-10 NOTE — ED ADULT NURSE NOTE - CAS TRG GENERAL AIRWAY, MLM
oriented to person, place and time , short and long term memory intact, nonfocal, gait normal Patent

## 2022-01-12 NOTE — ED POST DISCHARGE NOTE - DETAILS
Patient prescribed Cefpodoxime. Pending final culture results. WIll follow. Oriana Hilton PA-C 1/13/22: bacteria is not sensitive to Cefpodoxime. Attempted to contact pt regarding changing ABX. No answer. unable to leave message. no other phone number provided. Oriana Cervantes PA-C attempted to contact patient, unable to leave vm.  -Stephon Oneill PA-C

## 2022-01-12 NOTE — ED POST DISCHARGE NOTE - OTHER COMMUNICATION
1/15/22: Third attempt to contact pt, unable to leave vm. Gave form to rep Brain to send patient letter. Oriana Cervantes PA-C

## 2022-01-12 NOTE — ED POST DISCHARGE NOTE - ADDITIONAL DOCUMENTATION
1/28/22: Pt called back after receiving certified letter. Reports completion of cefpodoxime course and urinary sx/abdominal pain has resolved. feels well. I advised strict monitoring for new/worsening symptoms, outpt followup with PMD and strict return precautions if symptoms recur. all questions answered - Moncho Deluna PA-C

## 2023-03-24 ENCOUNTER — APPOINTMENT (OUTPATIENT)
Dept: PLASTIC SURGERY | Facility: CLINIC | Age: 38
End: 2023-03-24
Payer: MEDICARE

## 2023-03-24 VITALS
WEIGHT: 163 LBS | HEART RATE: 87 BPM | SYSTOLIC BLOOD PRESSURE: 133 MMHG | DIASTOLIC BLOOD PRESSURE: 83 MMHG | HEIGHT: 65 IN | BODY MASS INDEX: 27.16 KG/M2

## 2023-03-24 DIAGNOSIS — S61.229A: ICD-10-CM

## 2023-03-24 PROCEDURE — 99203 OFFICE O/P NEW LOW 30 MIN: CPT

## 2023-03-28 ENCOUNTER — APPOINTMENT (OUTPATIENT)
Dept: ORTHOPEDIC SURGERY | Facility: CLINIC | Age: 38
End: 2023-03-28
Payer: MEDICARE

## 2023-03-28 ENCOUNTER — NON-APPOINTMENT (OUTPATIENT)
Age: 38
End: 2023-03-28

## 2023-03-28 VITALS — HEIGHT: 65 IN | BODY MASS INDEX: 27.16 KG/M2 | WEIGHT: 163 LBS

## 2023-03-28 PROBLEM — S61.229A: Status: ACTIVE | Noted: 2023-03-28

## 2023-03-28 PROCEDURE — 73140 X-RAY EXAM OF FINGER(S): CPT

## 2023-03-28 PROCEDURE — 99204 OFFICE O/P NEW MOD 45 MIN: CPT

## 2023-03-28 NOTE — ASSESSMENT
[FreeTextEntry1] : Clinically, there is extremely high concern for retained foreign body within the flexor tendon sheath of the left middle finger.  The patient is at extremely high risk for flexor tenosynovitis.  We reviewed the risks of delayed treatment for further imaging.  The patient agreed to the procedure.\par \par However, the patient then left against medical advice to bring her child to the ER.  We reviewed that she should also check herself into the ER and attempt to get the issue addressed today / this weekend.  We reviewed that waiting until next Wednesday for me to treat this injury would be likely too long.  The patient agreed to continue taking her antibiotics.

## 2023-03-28 NOTE — HISTORY OF PRESENT ILLNESS
[FreeTextEntry1] : 38-year-old right-hand-dominant woman presents with a several day history of a splinter in the left middle finger.  She states she was seen in an outside ER, and the provider attempted to "dig it out."  No follow-up x-rays were performed.  She has brought the x-ray disc with her.  Currently, she complains of inability to actively flex the distal interphalangeal joint of the middle finger, due to pain.  She denies fever or chills.  She is taking antibiotics as prescribed by the ER.\par \par She is not currently working.  She lives with her children.  She is a daily smoker.  She denies alcohol or other recreational drug use.\par \par Outside x-ray was reviewed.  It represents a linear foreign body in the location of the flexor tendon sheath at the level of the middle phalanx.  No significant osseous abnormality noted.

## 2023-03-28 NOTE — ADDENDUM
[FreeTextEntry1] : 10 am: Discussed with the patient on the phone the urgent need for her to go to the ER to get this addressed.  \par \par 3 pm: \par Again discussed with the patient on the phone. The patient states that she did not check herself into the ER as they were unable to see both her and her child at the same time.  We reviewed the risks of waiting until next week for procedure. Discussed the potential for her to follow-up with Dr. Zachery Horner, an orthopedic hand surgeon, who was available to see her on Monday or Tuesday. The phone number was provided.

## 2023-03-28 NOTE — PHYSICAL EXAM
[de-identified] : NAD.  BMI 27.1 [de-identified] : Normal respiratory effort [de-identified] : Left middle finger with tenderness and punctum at volar aspect of middle phalanx.  There is no significant surrounding erythema.  There is mild swelling.  There is no significant pain on passive extension.  The finger is not in a flexed posture.  There is no tenderness over the flexor tendon sheath distal or proximal to the injury.

## 2023-03-29 ENCOUNTER — APPOINTMENT (OUTPATIENT)
Dept: ORTHOPEDIC SURGERY | Facility: CLINIC | Age: 38
End: 2023-03-29
Payer: MEDICARE

## 2023-03-29 PROCEDURE — 20520 RMVL FB MUSC/TDN SIMPLE: CPT

## 2023-04-01 NOTE — PROCEDURE
[de-identified] : ASSISTANT: Shruthi FISHMAN  \par PREOPERATIVE DIAGNOSES:  Left middle finger foreign body \par OPERATION:  Removal Foreign Body  \par POSTOPERATIVE DIAGNOSES:   Left middle finger foreign body  \par ANESTHESIA: Local.  \par ESTIMATED BLOOD LOSS: Minimal.  \par COMPLICATIONS:None.  \par INDICATIONS: Ms. HAN WHEAT is a pleasant 38 year old with a left middle finger foreign body that is bothersome. she would like it removed. The risks, benefits, and alternatives to surgery were discussed with the patient and they agreed to proceed. Written consent was obtained.  The patient was identified in the pre-procedure area. The left middle finger was marked with indelible marker. 10cc of buffered 1% lidocaine with epinephrine was instilled into the operative area for local anesthetic and hemostatic control. This was allowed to take effect for 25 minutes.  \par PROCEDURE: The patient was taken to the procedure room. A confirmation of planned operative procedure and operative site was performed. The arm was prepped and draped in a standard sterile fashion with Chloroprep.   We began by marking a 1 cm incision longitudinally over the puncture site, this was carried transversely in the volar crease for the DIPJ. This was brought sharply down through the skin and subcutaneous tissues. 2 Ragnell retractors were used to clear the overlying soft tissue.  A linear foreign body was encountered and removed from the tendon sheath -  It was approximately 5 mm in length and 1mm in diameter. The incision was then irrigated with normal saline and closed with 5-0 chromic sutures and dressed with Xeroform sterile gauze and Coban. The patient tolerated the procedure well. They were asked to sit up and had no apparent complications prior to discharge.  \par POSTOPERATIVE PLAN: The patient will remain in the dressing for 3 days alternating Tylenol and ibuprofen for post-procedure pain control. A sheet explaining postoperative finger exercises was given to the patient to prevent stiffness. They be able to remove the dressing after 3 days and take regular showers and wash hands. They should not soak the area. I will see them back in 10 to 14 days for an incision check and suture removal\par

## 2023-04-01 NOTE — PHYSICAL EXAM
[de-identified] : Gen: NAD\par RSP: Nonlabored\par MSK: LUE was seen and examined\par Puncutre site at middle phalanx with overlying callous- debrided, no visible or deliverable mass\par SILT throughout\par 2+ radial pulse\par Full range of motion though pain with DIP flexion [de-identified] : XR L Middle Finger: Radio-opaque foreign body in subcutaneous tissues of left middle finger middle phalanx

## 2023-04-01 NOTE — ASSESSMENT
[FreeTextEntry1] : 38 year-old F with foreign body at left middle finger\par \par Plan:\par Removal in procedure room tomorrow\par F/u for procedure

## 2023-04-18 ENCOUNTER — APPOINTMENT (OUTPATIENT)
Dept: ORTHOPEDIC SURGERY | Facility: CLINIC | Age: 38
End: 2023-04-18
Payer: MEDICARE

## 2023-04-18 PROCEDURE — 99213 OFFICE O/P EST LOW 20 MIN: CPT

## 2023-04-18 RX ORDER — SULFAMETHOXAZOLE AND TRIMETHOPRIM 800; 160 MG/1; MG/1
800-160 TABLET ORAL TWICE DAILY
Qty: 14 | Refills: 0 | Status: ACTIVE | COMMUNITY
Start: 2023-04-18 | End: 1900-01-01

## 2023-04-18 NOTE — HISTORY OF PRESENT ILLNESS
[de-identified] : L middle finger foreign body removal [de-identified] : Doing well\par Mild swelling at site\par No drainage\par No fevers or chills\par Improved pain and rOM [de-identified] : Incision c/d/i\par Sutures dissolving and trimmed\par 1cm tip to palm\par Mild swelling at site without drainage [de-identified] : s/p L middle finger foreign body removal [de-identified] : Bactrim to treat swelling in setting of foreign body\par F/u in 1 week for wound check\par AAT

## 2023-04-19 ENCOUNTER — APPOINTMENT (OUTPATIENT)
Dept: PLASTIC SURGERY | Facility: CLINIC | Age: 38
End: 2023-04-19

## 2023-04-28 ENCOUNTER — APPOINTMENT (OUTPATIENT)
Dept: ORTHOPEDIC SURGERY | Facility: CLINIC | Age: 38
End: 2023-04-28

## 2023-05-05 ENCOUNTER — APPOINTMENT (OUTPATIENT)
Dept: ORTHOPEDIC SURGERY | Facility: CLINIC | Age: 38
End: 2023-05-05
Payer: MEDICARE

## 2023-05-05 VITALS — HEIGHT: 65 IN | BODY MASS INDEX: 27.99 KG/M2 | WEIGHT: 168 LBS

## 2023-05-05 DIAGNOSIS — S60.453A: ICD-10-CM

## 2023-05-05 PROCEDURE — 99213 OFFICE O/P EST LOW 20 MIN: CPT

## 2023-08-01 NOTE — ED ADULT NURSE REASSESSMENT NOTE - NS ED NURSE REASSESS COMMENT FT1
Patient speaking with RN and MD Rodriguez, expressing that she is not willing to stay for surgery, stating she would only like to get methotrexate again. Pt visibly upset, tearful, stating she would just like to go home to her kids. Pt verbalizes understanding of severity of situation and risks. Vitals:  T(C): 36.7 (08-01-23 @ 12:45), Max: 36.7 (08-01-23 @ 12:45)  HR: 97 (08-01-23 @ 12:45) (97 - 97)  BP: 119/81 (08-01-23 @ 12:45) (119/81 - 119/81)  RR: 16 (08-01-23 @ 12:45) (16 - 16)  SpO2: 97% (08-01-23 @ 12:45) (97% - 97%)    Physical Examination:  General - Sitting at EOB, NAD, flat affect  Cardiovascular - No LE edema  Eyes - Fundoscopy not performed due to safety precautions in the setting of the COVID-19 pandemic. Wearing eyeglasses. Conjunctiva are non-injected.    Neurologic Exam:  Mental status - Awake, Alert, Oriented to person, place, and time. Speech fluent, repetition intact. Follows simple and complex commands (did need complex command repeated x1 before completing correctly). Refused to attempt to spell "WORLD" backwards, 0/3 on three word recall, 0/3 with cues, 1/3 with multiple choice    Cranial nerves - PERRLA, VFF, EOMI, face sensation (V1-V3) intact b/l, facial strength intact without asymmetry b/l, hearing intact b/l, palate with symmetric elevation, trapezius 5/5 strength b/l, tongue midline on protrusion with full lateral movement    Motor - Normal bulk and tone throughout. No pronator drift.  Strength testing            Deltoid      Biceps      Triceps     Wrist Extension    Wrist Flexion     Interossei         R            5                 5               5                     5                              5                        5                   L             5                 5               5                     5                              5                        5                               Hip Flexion    Knee Flexion    Knee Extension    Dorsiflexion    Plantar Flexion  R              5                           5                           5                            5                          5  L              5                            5                           5                            5                          5    Sensation - Light touch/vibration intact throughout    DTR's -             Biceps      Triceps     Brachioradialis      Patellar    Ankle      R             2+             2+                  2+                       2+            2+                 L              2+             2+                 2+                        2+           2+                     Coordination - Finger to Nose intact b/l. Heel to Jewell intact b/l. No tremors appreciated    Gait and station - Normal casual gait

## 2024-01-17 ENCOUNTER — APPOINTMENT (OUTPATIENT)
Dept: ORTHOPEDIC SURGERY | Facility: CLINIC | Age: 39
End: 2024-01-17
Payer: MEDICARE

## 2024-01-17 PROCEDURE — 73140 X-RAY EXAM OF FINGER(S): CPT | Mod: RT

## 2024-01-17 PROCEDURE — 99203 OFFICE O/P NEW LOW 30 MIN: CPT

## 2024-01-17 NOTE — IMAGING
[de-identified] : RIGHT HAND skin intact. moderate swelling of RF PIPJ. TTP to RF PIPJ. RF: PIPJ ext to neutral, mild hyperextension of DIPJ. PIPJ flexion deferred, DIPJ flicker flex. good flex/ext other digits.  SILT to median, ulnar, radial distribution.  palpable radial pulse, brisk cap refill all digits.   XRAYS OF RIGHT RING FINGER: displaced avulsion fx off middle phalanx dorsal base.

## 2024-01-17 NOTE — ASSESSMENT
[FreeTextEntry1] : The condition was explained to the patient. Discussed risk of persistent pain, stiffness, loss of motion, boutonniere deformity, instability, re-dislocation, post-traumatic arthritis. - prescribed OT custom PIPJ extension splint with DIPJ free. applied alumafoam splint in the interim. - demonstrated DIPJ flexion exercises, 5x/day. - light activity. no lifting/pushing/pulling.  F/u 1 week.

## 2024-01-17 NOTE — HISTORY OF PRESENT ILLNESS
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 Went to [facility] on 1/7/24 => XR showed volar fracture-dislocation, which was reduced and placed in alumafoam clamshell splint.  Hx: none. [] : no [FreeTextEntry5] : R. Ring Finger injury that occurred after slamming finger in car door on 01/07/2023. Went to [facility]; xrs taken. Finger was dislocated and was put back in place.

## 2024-02-05 ENCOUNTER — NON-APPOINTMENT (OUTPATIENT)
Age: 39
End: 2024-02-05

## 2024-02-07 ENCOUNTER — APPOINTMENT (OUTPATIENT)
Dept: ORTHOPEDIC SURGERY | Facility: CLINIC | Age: 39
End: 2024-02-07
Payer: MEDICARE

## 2024-02-07 PROCEDURE — 99213 OFFICE O/P EST LOW 20 MIN: CPT

## 2024-02-07 NOTE — IMAGING
[de-identified] : RIGHT HAND skin intact. moderate swelling of RF PIPJ. RF: PIPJ 20deg flex contracture, flexion deferred. DIPJ good rests in neutral extension, flicker flex. good flex/ext other digits.  SILT to median, ulnar, radial distribution.  palpable radial pulse, brisk cap refill all digits.

## 2024-02-07 NOTE — ASSESSMENT
[FreeTextEntry1] : Given interval development of PIPJ flexion contracture, will need to add LMB finger extension splint to correct flexion contracture (Rx provided). Discussed the increased complexity of her condition and increased risk of stiffness due to the prolonged immobilization needed to address this. I explained that volar PIPJ dislocations often have residual extensor lag due to the injury to the extensor mechanism, and her interval flexion contracture increases the likelihood of a suboptimal outcome. I reiterated the importance of strict adherence to the splinting protocol and restrictions. Patient stated that she cannot be expected to apply a splint correctly because she is not a hand specialist. I reviewed the proper application of the static and dynamic extension splints and stressed that the goal was to position the middle knuckle (PIP joint) as straight as possible, as viewed from the side. Patient demonstrated proper donning of the OT custom static PIPJ extension splint in the office today. - LMB finger extension splint - work up to 20 minutes, 3x/day. wear PIPJ static extension splint full time otherwise. - continue DIPJ flexion exercises. add PIPJ extension stretches, demonstrated in office today. I stressed the importance of stretching the PIPJ into extension to optimize outcomes. I explained that hand stiffness can cause grave dysfunction and is difficult to overcome once it has set in, and the sooner we can correct her flexion contracture, the better. I also measured the patient for and showed her where to purchase the LMB finger extension splint online without a prescription if she was unable to schedule an appointment with OT for the splint in a timely fashion (eg within 1 week). - light activity. no lifting/pushing/pulling.  F/u 2 weeks. X-rays of R ring finger in splint. Reiterated the importance of close monitoring of her progress and the importance of keeping her f/u appointment for re-evaluation.

## 2024-02-28 ENCOUNTER — APPOINTMENT (OUTPATIENT)
Dept: ORTHOPEDIC SURGERY | Facility: CLINIC | Age: 39
End: 2024-02-28
Payer: MEDICARE

## 2024-02-28 PROCEDURE — 99213 OFFICE O/P EST LOW 20 MIN: CPT

## 2024-02-28 PROCEDURE — 73140 X-RAY EXAM OF FINGER(S): CPT | Mod: RT

## 2024-02-28 NOTE — ASSESSMENT
[FreeTextEntry1] : - continue LMB finger extension splint - advised patient to only wear this when awake. wear static finger splint when out of LMB splint and when sleeping. she will call to schedule appointment with OT for splint adjustment. - continue DIPJ flexion exercises and PIPJ extension stretches. - light activity. no lifting/pushing/pulling.  F/u 4 weeks. X-rays of R ring finger out of splint.

## 2024-02-28 NOTE — HISTORY OF PRESENT ILLNESS
[FreeTextEntry5] : Follow Up-. R. Ring Finger. Finger has gotten straighter since last visit. Pain still present.  [de-identified] : 2/28/24: 7.5 weeks s/p RIGHT ring finger PIPJ volar dislocation with avulsion fx from 1/7/24. Started LMB finger extension splint ~2 weeks ago, wears it intermittently during the day. reports that she rarely uses the static finger splint because it has an "arch" matching her prior flexion contracture, and that she instead wears the LMB splint for sleep but sets alarms every 40 minutes to switch between the LMB and static splints.  2/7/24: 4.5 weeks s/p RIGHT ring finger PIPJ volar dislocation with avulsion fx from 1/7/24. Since last visit, patient's OT contacted me that patient presented for splint fitting on 2/5/24 without alumafoam dorsal PIPJ extension splint and with PIPJ flexion contracture. Patient reports that she removed the splint on 2/4/24 because it was wet and smelled bad, but admits that she is unsure if it had slid out of position prior to that.  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 Went to [facility] on 1/7/24 => XR showed volar fracture-dislocation, which was reduced and placed in alumafoam clamshell splint.  Hx: none.

## 2024-02-28 NOTE — IMAGING
[de-identified] : RIGHT HAND skin intact. moderate swelling of RF PIPJ. RF: PIPJ 20deg flex contracture, flexion deferred. DIPJ rests in mild hyper-extension, very limited flex. SILT to median, ulnar, radial distribution.  palpable radial pulse, brisk cap refill all digits.   XRAYS OF RIGHT RING FINGER: stable position/alignment with interval healing of middle phalanx dorsal base intra-articular fx. concentric PIPJ.

## 2024-03-27 ENCOUNTER — APPOINTMENT (OUTPATIENT)
Dept: ORTHOPEDIC SURGERY | Facility: CLINIC | Age: 39
End: 2024-03-27

## 2024-04-03 ENCOUNTER — NON-APPOINTMENT (OUTPATIENT)
Age: 39
End: 2024-04-03

## 2024-04-03 ENCOUNTER — APPOINTMENT (OUTPATIENT)
Dept: ORTHOPEDIC SURGERY | Facility: CLINIC | Age: 39
End: 2024-04-03

## 2024-04-30 ENCOUNTER — NON-APPOINTMENT (OUTPATIENT)
Age: 39
End: 2024-04-30

## 2024-05-08 ENCOUNTER — APPOINTMENT (OUTPATIENT)
Dept: ORTHOPEDIC SURGERY | Facility: CLINIC | Age: 39
End: 2024-05-08
Payer: MEDICARE

## 2024-05-08 DIAGNOSIS — S62.624A DISPLACED FRACTURE OF MIDDLE PHALANX OF RIGHT RING FINGER, INITIAL ENCOUNTER FOR CLOSED FRACTURE: ICD-10-CM

## 2024-05-08 DIAGNOSIS — S63.284A: ICD-10-CM

## 2024-05-08 PROCEDURE — 73140 X-RAY EXAM OF FINGER(S): CPT | Mod: RT

## 2024-05-08 PROCEDURE — 99213 OFFICE O/P EST LOW 20 MIN: CPT

## 2024-05-08 NOTE — IMAGING
[de-identified] : RIGHT HAND skin intact. mild swelling of RF PIPJ. RF: PIPJ 15deg flex contracture, otherwise good extension. PIPJ mild limited flexion, DIPJ very limited flexion. composite flexion 2cm to proximal palm. SILT to median, ulnar, radial distribution.  palpable radial pulse, brisk cap refill all digits.   XRAYS OF RIGHT RING FINGER: stable position/alignment with interval healing of middle phalanx dorsal base intra-articular fx. concentric PIPJ.

## 2024-05-08 NOTE — ASSESSMENT
[FreeTextEntry1] : - ok to wear LMB finger extension splint intermittently during the day. ok to wear static splint for sleeping.  - prescribed OT for R ring finger.  - gradually advance activity as pain allows.  F/u 6 weeks.

## 2024-05-08 NOTE — HISTORY OF PRESENT ILLNESS
[de-identified] : 5/8/24: 4 months s/p RIGHT ring finger PIPJ volar dislocation with avulsion fx from 1/7/24.  2/28/24: 7.5 weeks s/p RIGHT ring finger PIPJ volar dislocation with avulsion fx from 1/7/24. Started LMB finger extension splint ~2 weeks ago, wears it intermittently during the day. reports that she rarely uses the static finger splint because it has an "arch" matching her prior flexion contracture, and that she instead wears the LMB splint for sleep but sets alarms every 40 minutes to switch between the LMB and static splints.  2/7/24: 4.5 weeks s/p RIGHT ring finger PIPJ volar dislocation with avulsion fx from 1/7/24. Since last visit, patient's OT contacted me that patient presented for splint fitting on 2/5/24 without alumafoam dorsal PIPJ extension splint and with PIPJ flexion contracture. Patient reports that she removed the splint on 2/4/24 because it was wet and smelled bad, but admits that she is unsure if it had slid out of position prior to that.  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 Went to [facility] on 1/7/24 => XR showed volar fracture-dislocation, which was reduced and placed in alumafoam clamshell splint.  Hx: none. [FreeTextEntry1] : RIGHT ring finger  [FreeTextEntry5] : HAN is here today to follow up on her RIGHT ring finger. states finger is stiff and tight. she is unable to bend finger. states brace is not helping with progression.

## 2024-06-19 ENCOUNTER — APPOINTMENT (OUTPATIENT)
Dept: ORTHOPEDIC SURGERY | Facility: CLINIC | Age: 39
End: 2024-06-19

## 2024-07-11 ENCOUNTER — APPOINTMENT (OUTPATIENT)
Dept: INTERNAL MEDICINE | Facility: CLINIC | Age: 39
End: 2024-07-11

## 2024-08-05 ENCOUNTER — APPOINTMENT (OUTPATIENT)
Dept: ORTHOPEDIC SURGERY | Facility: CLINIC | Age: 39
End: 2024-08-05

## 2024-08-08 ENCOUNTER — APPOINTMENT (OUTPATIENT)
Dept: ORTHOPEDIC SURGERY | Facility: CLINIC | Age: 39
End: 2024-08-08

## 2024-08-08 PROCEDURE — 99213 OFFICE O/P EST LOW 20 MIN: CPT

## 2024-08-08 NOTE — ASSESSMENT
[FreeTextEntry1] : - renewed OT for R ring finger. add static progressive flexion splint. - activity as tolerated.  F/u 6 weeks.

## 2024-08-08 NOTE — IMAGING
[de-identified] : RIGHT HAND skin intact. mild swelling of RF PIPJ. no TTP. RF: PIPJ 25deg flex contracture, otherwise good extension. composite flexion to mid-palm. SILT to median, ulnar, radial distribution.  palpable radial pulse, brisk cap refill all digits. no triggering.

## 2024-08-08 NOTE — HISTORY OF PRESENT ILLNESS
[de-identified] : 8/8/24: 7 months s/p RIGHT ring finger PIPJ volar dislocation with avulsion fx from 1/7/24. pain resolved, motion improving, but still lacking some flexion/extension. OT 2x/wk @[facility].  5/8/24: 4 months s/p RIGHT ring finger PIPJ volar dislocation with avulsion fx from 1/7/24.  2/28/24: 7.5 weeks s/p RIGHT ring finger PIPJ volar dislocation with avulsion fx from 1/7/24. Started LMB finger extension splint ~2 weeks ago, wears it intermittently during the day. reports that she rarely uses the static finger splint because it has an "arch" matching her prior flexion contracture, and that she instead wears the LMB splint for sleep but sets alarms every 40 minutes to switch between the LMB and static splints.  2/7/24: 4.5 weeks s/p RIGHT ring finger PIPJ volar dislocation with avulsion fx from 1/7/24. Since last visit, patient's OT contacted me that patient presented for splint fitting on 2/5/24 without alumafoam dorsal PIPJ extension splint and with PIPJ flexion contracture. Patient reports that she removed the splint on 2/4/24 because it was wet and smelled bad, but admits that she is unsure if it had slid out of position prior to that.  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 Went to [facility] on 1/7/24 => XR showed volar fracture-dislocation, which was reduced and placed in alumafoam clamshell splint.  Hx: none. [FreeTextEntry1] : RIGHT ring finger  [FreeTextEntry5] : HAN is here today to follow up on her RIGHT ring finger. she is unable to bend finger. Has been doing OT, Stated has been helping. Slight swelling.  [de-identified] : OT

## 2024-09-06 ENCOUNTER — APPOINTMENT (OUTPATIENT)
Dept: ORTHOPEDIC SURGERY | Facility: CLINIC | Age: 39
End: 2024-09-06
Payer: MEDICARE

## 2024-09-06 DIAGNOSIS — T14.8XXA OTHER INJURY OF UNSPECIFIED BODY REGION, INITIAL ENCOUNTER: ICD-10-CM

## 2024-09-06 PROCEDURE — 99213 OFFICE O/P EST LOW 20 MIN: CPT

## 2024-09-06 PROCEDURE — 73140 X-RAY EXAM OF FINGER(S): CPT | Mod: 26,F7

## 2024-09-24 ENCOUNTER — APPOINTMENT (OUTPATIENT)
Dept: ORTHOPEDIC SURGERY | Facility: CLINIC | Age: 39
End: 2024-09-24

## 2024-10-14 ENCOUNTER — APPOINTMENT (OUTPATIENT)
Dept: ORTHOPEDIC SURGERY | Facility: CLINIC | Age: 39
End: 2024-10-14
Payer: MEDICARE

## 2024-10-17 ENCOUNTER — APPOINTMENT (OUTPATIENT)
Dept: ORTHOPEDIC SURGERY | Facility: CLINIC | Age: 39
End: 2024-10-17
Payer: MEDICARE

## 2024-10-17 DIAGNOSIS — S63.284A: ICD-10-CM

## 2024-10-17 PROCEDURE — 99213 OFFICE O/P EST LOW 20 MIN: CPT

## 2025-04-01 ENCOUNTER — APPOINTMENT (OUTPATIENT)
Dept: INTERNAL MEDICINE | Facility: CLINIC | Age: 40
End: 2025-04-01

## 2025-04-01 VITALS
BODY MASS INDEX: 29.44 KG/M2 | RESPIRATION RATE: 18 BRPM | WEIGHT: 160 LBS | OXYGEN SATURATION: 99 % | HEART RATE: 87 BPM | SYSTOLIC BLOOD PRESSURE: 131 MMHG | DIASTOLIC BLOOD PRESSURE: 88 MMHG | TEMPERATURE: 97.3 F | HEIGHT: 62 IN

## 2025-04-01 DIAGNOSIS — Z87.42 PERSONAL HISTORY OF OTHER DISEASES OF THE FEMALE GENITAL TRACT: ICD-10-CM

## 2025-04-01 DIAGNOSIS — S61.229A: ICD-10-CM

## 2025-04-01 DIAGNOSIS — Z20.2 CONTACT WITH AND (SUSPECTED) EXPOSURE TO INFECTIONS WITH A PREDOMINANTLY SEXUAL MODE OF TRANSMISSION: ICD-10-CM

## 2025-04-01 DIAGNOSIS — T14.8XXA OTHER INJURY OF UNSPECIFIED BODY REGION, INITIAL ENCOUNTER: ICD-10-CM

## 2025-04-01 DIAGNOSIS — N92.6 IRREGULAR MENSTRUATION, UNSPECIFIED: ICD-10-CM

## 2025-04-01 DIAGNOSIS — R01.1 CARDIAC MURMUR, UNSPECIFIED: ICD-10-CM

## 2025-04-01 DIAGNOSIS — Z87.898 PERSONAL HISTORY OF OTHER SPECIFIED CONDITIONS: ICD-10-CM

## 2025-04-01 DIAGNOSIS — L02.91 CUTANEOUS ABSCESS, UNSPECIFIED: ICD-10-CM

## 2025-04-01 DIAGNOSIS — Z12.39 ENCOUNTER FOR OTHER SCREENING FOR MALIGNANT NEOPLASM OF BREAST: ICD-10-CM

## 2025-04-01 DIAGNOSIS — S63.284A: ICD-10-CM

## 2025-04-01 DIAGNOSIS — M25.50 PAIN IN UNSPECIFIED JOINT: ICD-10-CM

## 2025-04-01 DIAGNOSIS — S60.453A: ICD-10-CM

## 2025-04-01 DIAGNOSIS — Z30.40 ENCOUNTER FOR SURVEILLANCE OF CONTRACEPTIVES, UNSPECIFIED: ICD-10-CM

## 2025-04-01 DIAGNOSIS — O36.80X0 PREGNANCY WITH INCONCLUSIVE FETAL VIABILITY, NOT APPLICABLE OR UNSPECIFIED: ICD-10-CM

## 2025-04-01 DIAGNOSIS — F32.A DEPRESSION, UNSPECIFIED: ICD-10-CM

## 2025-04-01 DIAGNOSIS — N94.9 UNSPECIFIED CONDITION ASSOCIATED WITH FEMALE GENITAL ORGANS AND MENSTRUAL CYCLE: ICD-10-CM

## 2025-04-01 DIAGNOSIS — F17.210 NICOTINE DEPENDENCE, CIGARETTES, UNCOMPLICATED: ICD-10-CM

## 2025-04-01 DIAGNOSIS — N92.0 EXCESSIVE AND FREQUENT MENSTRUATION WITH REGULAR CYCLE: ICD-10-CM

## 2025-04-01 DIAGNOSIS — S13.9XXA SPRAIN OF JOINTS AND LIGAMENTS OF UNSPECIFIED PARTS OF NECK, INITIAL ENCOUNTER: ICD-10-CM

## 2025-04-01 DIAGNOSIS — Z30.09 ENCOUNTER FOR OTHER GENERAL COUNSELING AND ADVICE ON CONTRACEPTION: ICD-10-CM

## 2025-04-01 DIAGNOSIS — Z76.89 PERSONS ENCOUNTERING HEALTH SERVICES IN OTHER SPECIFIED CIRCUMSTANCES: ICD-10-CM

## 2025-04-01 DIAGNOSIS — Z11.3 ENCOUNTER FOR SCREENING FOR INFECTIONS WITH A PREDOMINANTLY SEXUAL MODE OF TRANSMISSION: ICD-10-CM

## 2025-04-01 DIAGNOSIS — S62.624A DISPLACED FRACTURE OF MIDDLE PHALANX OF RIGHT RING FINGER, INITIAL ENCOUNTER FOR CLOSED FRACTURE: ICD-10-CM

## 2025-04-01 DIAGNOSIS — F31.9 BIPOLAR DISORDER, UNSPECIFIED: ICD-10-CM

## 2025-04-01 DIAGNOSIS — Z87.59 PERSONAL HISTORY OF OTHER COMPLICATIONS OF PREGNANCY, CHILDBIRTH AND THE PUERPERIUM: ICD-10-CM

## 2025-04-01 PROCEDURE — 99205 OFFICE O/P NEW HI 60 MIN: CPT

## 2025-04-01 RX ORDER — MEDROXYPROGESTERONE ACETATE 400 MG/ML
INJECTION, SUSPENSION INTRAMUSCULAR
Refills: 0 | Status: DISCONTINUED | COMMUNITY
End: 2025-04-01

## 2025-04-03 DIAGNOSIS — R74.8 ABNORMAL LEVELS OF OTHER SERUM ENZYMES: ICD-10-CM

## 2025-04-03 DIAGNOSIS — E55.9 VITAMIN D DEFICIENCY, UNSPECIFIED: ICD-10-CM

## 2025-04-03 LAB
25(OH)D3 SERPL-MCNC: 7.6 NG/ML
ALBUMIN SERPL ELPH-MCNC: 4.4 G/DL
ALP BLD-CCNC: 121 U/L
ALT SERPL-CCNC: 20 U/L
ANION GAP SERPL CALC-SCNC: 12 MMOL/L
AST SERPL-CCNC: 17 U/L
BASOPHILS # BLD AUTO: 0.05 K/UL
BASOPHILS NFR BLD AUTO: 1 %
BILIRUB SERPL-MCNC: 0.4 MG/DL
BUN SERPL-MCNC: 13 MG/DL
CALCIUM SERPL-MCNC: 9.2 MG/DL
CHLORIDE SERPL-SCNC: 106 MMOL/L
CHOLEST SERPL-MCNC: 138 MG/DL
CO2 SERPL-SCNC: 22 MMOL/L
CREAT SERPL-MCNC: 0.89 MG/DL
EGFRCR SERPLBLD CKD-EPI 2021: 83 ML/MIN/1.73M2
EOSINOPHIL # BLD AUTO: 0.02 K/UL
EOSINOPHIL NFR BLD AUTO: 0.4 %
ESTIMATED AVERAGE GLUCOSE: 100 MG/DL
FERRITIN SERPL-MCNC: 55 NG/ML
FOLATE SERPL-MCNC: 10.3 NG/ML
GLUCOSE SERPL-MCNC: 100 MG/DL
HBA1C MFR BLD HPLC: 5.1 %
HCT VFR BLD CALC: 40.8 %
HDLC SERPL-MCNC: 66 MG/DL
HGB BLD-MCNC: 13.3 G/DL
IMM GRANULOCYTES NFR BLD AUTO: 0 %
IRON SATN MFR SERPL: 38 %
IRON SERPL-MCNC: 130 UG/DL
LDLC SERPL-MCNC: 63 MG/DL
LYMPHOCYTES # BLD AUTO: 1.97 K/UL
LYMPHOCYTES NFR BLD AUTO: 39.4 %
MAN DIFF?: NORMAL
MCHC RBC-ENTMCNC: 31.1 PG
MCHC RBC-ENTMCNC: 32.6 G/DL
MCV RBC AUTO: 95.6 FL
MONOCYTES # BLD AUTO: 0.39 K/UL
MONOCYTES NFR BLD AUTO: 7.8 %
NEUTROPHILS # BLD AUTO: 2.57 K/UL
NEUTROPHILS NFR BLD AUTO: 51.4 %
NONHDLC SERPL-MCNC: 72 MG/DL
PLATELET # BLD AUTO: 299 K/UL
POTASSIUM SERPL-SCNC: 3.7 MMOL/L
PROT SERPL-MCNC: 6.8 G/DL
RBC # BLD: 4.27 M/UL
RBC # FLD: 13.4 %
SODIUM SERPL-SCNC: 140 MMOL/L
TIBC SERPL-MCNC: 341 UG/DL
TRIGL SERPL-MCNC: 39 MG/DL
TSH SERPL-ACNC: 0.93 UIU/ML
UIBC SERPL-MCNC: 211 UG/DL
VIT B12 SERPL-MCNC: 469 PG/ML
WBC # FLD AUTO: 5 K/UL

## 2025-04-03 RX ORDER — ERGOCALCIFEROL 1.25 MG/1
1.25 MG CAPSULE, LIQUID FILLED ORAL
Qty: 12 | Refills: 0 | Status: ACTIVE | COMMUNITY
Start: 2025-04-03 | End: 1900-01-01

## 2025-04-04 LAB — GGT SERPL-CCNC: 37 U/L

## 2025-04-08 ENCOUNTER — APPOINTMENT (OUTPATIENT)
Dept: RADIOLOGY | Facility: CLINIC | Age: 40
End: 2025-04-08
Payer: MEDICARE

## 2025-04-08 ENCOUNTER — APPOINTMENT (OUTPATIENT)
Dept: ULTRASOUND IMAGING | Facility: CLINIC | Age: 40
End: 2025-04-08
Payer: MEDICARE

## 2025-04-08 ENCOUNTER — OUTPATIENT (OUTPATIENT)
Dept: OUTPATIENT SERVICES | Facility: HOSPITAL | Age: 40
LOS: 1 days | End: 2025-04-08
Payer: MEDICARE

## 2025-04-08 DIAGNOSIS — Z00.8 ENCOUNTER FOR OTHER GENERAL EXAMINATION: ICD-10-CM

## 2025-04-08 PROCEDURE — 71046 X-RAY EXAM CHEST 2 VIEWS: CPT | Mod: 26

## 2025-04-08 PROCEDURE — 76700 US EXAM ABDOM COMPLETE: CPT | Mod: 26

## 2025-04-08 PROCEDURE — 71046 X-RAY EXAM CHEST 2 VIEWS: CPT

## 2025-04-08 PROCEDURE — 76700 US EXAM ABDOM COMPLETE: CPT

## 2025-04-10 ENCOUNTER — NON-APPOINTMENT (OUTPATIENT)
Age: 40
End: 2025-04-10

## 2025-04-10 ENCOUNTER — APPOINTMENT (OUTPATIENT)
Dept: CARDIOLOGY | Facility: CLINIC | Age: 40
End: 2025-04-10
Payer: MEDICARE

## 2025-04-10 VITALS
HEART RATE: 86 BPM | WEIGHT: 162 LBS | OXYGEN SATURATION: 97 % | HEIGHT: 65 IN | BODY MASS INDEX: 26.99 KG/M2 | DIASTOLIC BLOOD PRESSURE: 82 MMHG | SYSTOLIC BLOOD PRESSURE: 120 MMHG

## 2025-04-10 DIAGNOSIS — R06.09 OTHER FORMS OF DYSPNEA: ICD-10-CM

## 2025-04-10 DIAGNOSIS — R01.1 CARDIAC MURMUR, UNSPECIFIED: ICD-10-CM

## 2025-04-10 PROCEDURE — 93000 ELECTROCARDIOGRAM COMPLETE: CPT

## 2025-04-10 PROCEDURE — 99203 OFFICE O/P NEW LOW 30 MIN: CPT | Mod: 25

## 2025-04-17 ENCOUNTER — APPOINTMENT (OUTPATIENT)
Dept: INTERNAL MEDICINE | Facility: CLINIC | Age: 40
End: 2025-04-17

## 2025-04-17 DIAGNOSIS — F17.210 NICOTINE DEPENDENCE, CIGARETTES, UNCOMPLICATED: ICD-10-CM

## 2025-04-17 DIAGNOSIS — R01.1 CARDIAC MURMUR, UNSPECIFIED: ICD-10-CM

## 2025-04-17 DIAGNOSIS — R93.89 ABNORMAL FINDINGS ON DIAGNOSTIC IMAGING OF OTHER SPECIFIED BODY STRUCTURES: ICD-10-CM

## 2025-04-17 DIAGNOSIS — R74.8 ABNORMAL LEVELS OF OTHER SERUM ENZYMES: ICD-10-CM

## 2025-04-17 PROCEDURE — 99204 OFFICE O/P NEW MOD 45 MIN: CPT | Mod: 2W

## 2025-04-25 ENCOUNTER — APPOINTMENT (OUTPATIENT)
Dept: CT IMAGING | Facility: CLINIC | Age: 40
End: 2025-04-25

## 2025-04-25 ENCOUNTER — OUTPATIENT (OUTPATIENT)
Dept: OUTPATIENT SERVICES | Facility: HOSPITAL | Age: 40
LOS: 1 days | End: 2025-04-25
Payer: MEDICARE

## 2025-04-25 DIAGNOSIS — Z00.8 ENCOUNTER FOR OTHER GENERAL EXAMINATION: ICD-10-CM

## 2025-04-25 PROCEDURE — 71250 CT THORAX DX C-: CPT

## 2025-04-25 PROCEDURE — 71250 CT THORAX DX C-: CPT | Mod: 26

## 2025-05-05 ENCOUNTER — NON-APPOINTMENT (OUTPATIENT)
Age: 40
End: 2025-05-05

## 2025-05-12 ENCOUNTER — APPOINTMENT (OUTPATIENT)
Dept: CARDIOLOGY | Facility: CLINIC | Age: 40
End: 2025-05-12
Payer: MEDICARE

## 2025-05-12 PROCEDURE — 93306 TTE W/DOPPLER COMPLETE: CPT

## 2025-05-13 ENCOUNTER — APPOINTMENT (OUTPATIENT)
Dept: PULMONOLOGY | Facility: CLINIC | Age: 40
End: 2025-05-13
Payer: MEDICARE

## 2025-05-13 VITALS
HEART RATE: 88 BPM | DIASTOLIC BLOOD PRESSURE: 96 MMHG | BODY MASS INDEX: 27.99 KG/M2 | HEIGHT: 65 IN | WEIGHT: 168 LBS | SYSTOLIC BLOOD PRESSURE: 135 MMHG | OXYGEN SATURATION: 98 %

## 2025-05-13 DIAGNOSIS — F17.210 NICOTINE DEPENDENCE, CIGARETTES, UNCOMPLICATED: ICD-10-CM

## 2025-05-13 DIAGNOSIS — R93.89 ABNORMAL FINDINGS ON DIAGNOSTIC IMAGING OF OTHER SPECIFIED BODY STRUCTURES: ICD-10-CM

## 2025-05-13 DIAGNOSIS — R53.83 OTHER FATIGUE: ICD-10-CM

## 2025-05-13 PROCEDURE — 99406 BEHAV CHNG SMOKING 3-10 MIN: CPT

## 2025-05-13 PROCEDURE — 94729 DIFFUSING CAPACITY: CPT

## 2025-05-13 PROCEDURE — 99205 OFFICE O/P NEW HI 60 MIN: CPT | Mod: 25

## 2025-05-13 PROCEDURE — 94727 GAS DIL/WSHOT DETER LNG VOL: CPT

## 2025-05-13 PROCEDURE — G2211 COMPLEX E/M VISIT ADD ON: CPT

## 2025-05-13 PROCEDURE — 94060 EVALUATION OF WHEEZING: CPT

## 2025-05-13 RX ORDER — NICOTINE POLACRILEX 2 MG/1
2 LOZENGE ORAL
Qty: 90 | Refills: 3 | Status: ACTIVE | COMMUNITY
Start: 2025-05-13 | End: 1900-01-01

## 2025-05-15 ENCOUNTER — NON-APPOINTMENT (OUTPATIENT)
Age: 40
End: 2025-05-15

## 2025-05-27 NOTE — ED ADULT NURSE NOTE - CAS TRG GEN SKIN CONDITION
Warm/Dry
Patient feeling better with the pain medication. CT scan with mild sinusitis partial empty sella turcica explained  recommended follow-up outpatient with the primary care  and neurosurgery

## 2025-06-03 ENCOUNTER — APPOINTMENT (OUTPATIENT)
Dept: SLEEP CENTER | Facility: CLINIC | Age: 40
End: 2025-06-03

## 2025-06-03 PROCEDURE — ZZZZZ: CPT
